# Patient Record
Sex: MALE | Race: WHITE | NOT HISPANIC OR LATINO
[De-identification: names, ages, dates, MRNs, and addresses within clinical notes are randomized per-mention and may not be internally consistent; named-entity substitution may affect disease eponyms.]

---

## 2017-04-05 ENCOUNTER — APPOINTMENT (OUTPATIENT)
Dept: UROLOGY | Facility: CLINIC | Age: 67
End: 2017-04-05

## 2017-04-06 LAB
APPEARANCE: CLEAR
BACTERIA: NEGATIVE
BILIRUBIN URINE: NEGATIVE
BLOOD URINE: NEGATIVE
COLOR: YELLOW
CORE LAB FLUID CYTOLOGY: NORMAL
GLUCOSE QUALITATIVE U: NORMAL MG/DL
HYALINE CASTS: 0 /LPF
KETONES URINE: NEGATIVE
LEUKOCYTE ESTERASE URINE: NEGATIVE
MICROSCOPIC-UA: NORMAL
NITRITE URINE: NEGATIVE
PH URINE: 6
PROTEIN URINE: NEGATIVE MG/DL
PSA FREE FLD-MCNC: 23.8 %
PSA FREE SERPL-MCNC: 0.71 NG/ML
PSA SERPL-MCNC: 2.98 NG/ML
RED BLOOD CELLS URINE: 2 /HPF
SPECIFIC GRAVITY URINE: 1.02
SQUAMOUS EPITHELIAL CELLS: 0 /HPF
UROBILINOGEN URINE: NORMAL MG/DL
WHITE BLOOD CELLS URINE: 1 /HPF

## 2017-05-03 ENCOUNTER — RX RENEWAL (OUTPATIENT)
Age: 67
End: 2017-05-03

## 2017-10-11 ENCOUNTER — APPOINTMENT (OUTPATIENT)
Dept: UROLOGY | Facility: CLINIC | Age: 67
End: 2017-10-11
Payer: MEDICARE

## 2017-10-11 PROCEDURE — 51798 US URINE CAPACITY MEASURE: CPT

## 2017-10-11 PROCEDURE — 99214 OFFICE O/P EST MOD 30 MIN: CPT

## 2017-10-12 LAB
APPEARANCE: CLEAR
BACTERIA: NEGATIVE
BILIRUBIN URINE: NEGATIVE
BLOOD URINE: NEGATIVE
COLOR: YELLOW
CORE LAB FLUID CYTOLOGY: NORMAL
GLUCOSE QUALITATIVE U: NEGATIVE MG/DL
KETONES URINE: NEGATIVE
LEUKOCYTE ESTERASE URINE: NEGATIVE
MICROSCOPIC-UA: NORMAL
NITRITE URINE: NEGATIVE
PH URINE: 5.5
PROTEIN URINE: NEGATIVE MG/DL
PSA FREE FLD-MCNC: 25.8
PSA FREE SERPL-MCNC: 0.66 NG/ML
PSA SERPL-MCNC: 2.56 NG/ML
RED BLOOD CELLS URINE: 3 /HPF
SPECIFIC GRAVITY URINE: 1.01
SQUAMOUS EPITHELIAL CELLS: 0 /HPF
UROBILINOGEN URINE: NEGATIVE MG/DL
WHITE BLOOD CELLS URINE: 0 /HPF

## 2017-10-23 ENCOUNTER — RX RENEWAL (OUTPATIENT)
Age: 67
End: 2017-10-23

## 2017-11-17 ENCOUNTER — RX RENEWAL (OUTPATIENT)
Age: 67
End: 2017-11-17

## 2018-04-18 ENCOUNTER — APPOINTMENT (OUTPATIENT)
Dept: UROLOGY | Facility: CLINIC | Age: 68
End: 2018-04-18
Payer: MEDICARE

## 2018-04-18 DIAGNOSIS — R97.20 ELEVATED PROSTATE, SPECIFIC ANTIGEN [PSA]: ICD-10-CM

## 2018-04-18 DIAGNOSIS — Z00.00 ENCOUNTER FOR GENERAL ADULT MEDICAL EXAMINATION W/OUT ABNORMAL FINDINGS: ICD-10-CM

## 2018-04-18 PROCEDURE — 99214 OFFICE O/P EST MOD 30 MIN: CPT

## 2018-04-18 PROCEDURE — 51798 US URINE CAPACITY MEASURE: CPT

## 2018-04-19 LAB
APPEARANCE: CLEAR
BACTERIA: NEGATIVE
BILIRUBIN URINE: NEGATIVE
BLOOD URINE: NEGATIVE
COLOR: YELLOW
GLUCOSE QUALITATIVE U: NEGATIVE MG/DL
KETONES URINE: NEGATIVE
LEUKOCYTE ESTERASE URINE: NEGATIVE
MICROSCOPIC-UA: NORMAL
NITRITE URINE: NEGATIVE
PH URINE: 5.5
PROTEIN URINE: NEGATIVE MG/DL
PSA FREE FLD-MCNC: 25.7
PSA FREE SERPL-MCNC: 0.67 NG/ML
PSA SERPL-MCNC: 2.61 NG/ML
RED BLOOD CELLS URINE: 0 /HPF
SPECIFIC GRAVITY URINE: 1.01
SQUAMOUS EPITHELIAL CELLS: 0 /HPF
UROBILINOGEN URINE: NEGATIVE MG/DL
WHITE BLOOD CELLS URINE: 0 /HPF

## 2018-04-23 LAB — CORE LAB FLUID CYTOLOGY: NORMAL

## 2018-06-13 ENCOUNTER — RX RENEWAL (OUTPATIENT)
Age: 68
End: 2018-06-13

## 2018-10-17 ENCOUNTER — APPOINTMENT (OUTPATIENT)
Dept: UROLOGY | Facility: CLINIC | Age: 68
End: 2018-10-17
Payer: MEDICARE

## 2018-10-17 PROCEDURE — 99214 OFFICE O/P EST MOD 30 MIN: CPT

## 2018-10-18 LAB
APPEARANCE: CLEAR
BACTERIA: NEGATIVE
BILIRUBIN URINE: NEGATIVE
BLOOD URINE: NEGATIVE
COLOR: YELLOW
GLUCOSE QUALITATIVE U: NEGATIVE MG/DL
KETONES URINE: NEGATIVE
LEUKOCYTE ESTERASE URINE: NEGATIVE
MICROSCOPIC-UA: NORMAL
NITRITE URINE: NEGATIVE
PH URINE: 5.5
PROTEIN URINE: NEGATIVE MG/DL
PSA FREE FLD-MCNC: 30
PSA FREE SERPL-MCNC: 0.86 NG/ML
PSA SERPL-MCNC: 2.87 NG/ML
RED BLOOD CELLS URINE: 1 /HPF
SPECIFIC GRAVITY URINE: 1.01
SQUAMOUS EPITHELIAL CELLS: 0 /HPF
UROBILINOGEN URINE: NEGATIVE MG/DL
WHITE BLOOD CELLS URINE: 1 /HPF

## 2018-12-16 ENCOUNTER — RX RENEWAL (OUTPATIENT)
Age: 68
End: 2018-12-16

## 2019-03-19 ENCOUNTER — RX RENEWAL (OUTPATIENT)
Age: 69
End: 2019-03-19

## 2019-04-11 ENCOUNTER — APPOINTMENT (OUTPATIENT)
Dept: UROLOGY | Facility: CLINIC | Age: 69
End: 2019-04-11
Payer: MEDICARE

## 2019-04-11 PROCEDURE — 51798 US URINE CAPACITY MEASURE: CPT

## 2019-04-11 PROCEDURE — 99214 OFFICE O/P EST MOD 30 MIN: CPT | Mod: 25

## 2019-04-11 NOTE — PHYSICAL EXAM
[General Appearance - Well Developed] : well developed [General Appearance - Well Nourished] : well nourished [Well Groomed] : well groomed [Normal Appearance] : normal appearance [Abdomen Soft] : soft [General Appearance - In No Acute Distress] : no acute distress [Abdomen Tenderness] : non-tender [Costovertebral Angle Tenderness] : no ~M costovertebral angle tenderness [Urethral Meatus] : meatus normal [Urinary Bladder Findings] : the bladder was normal on palpation [Testes Mass (___cm)] : there were no testicular masses [Scrotum] : the scrotum was normal [No Prostate Nodules] : no prostate nodules [] : no respiratory distress [Edema] : no peripheral edema [Respiration, Rhythm And Depth] : normal respiratory rhythm and effort [Exaggerated Use Of Accessory Muscles For Inspiration] : no accessory muscle use [Oriented To Time, Place, And Person] : oriented to person, place, and time [Affect] : the affect was normal [Mood] : the mood was normal [Not Anxious] : not anxious [Normal Station and Gait] : the gait and station were normal for the patient's age [No Focal Deficits] : no focal deficits [No Palpable Adenopathy] : no palpable adenopathy

## 2019-04-12 LAB
APPEARANCE: CLEAR
BACTERIA: NEGATIVE
BILIRUBIN URINE: NEGATIVE
BLOOD URINE: NEGATIVE
COLOR: YELLOW
GLUCOSE QUALITATIVE U: NEGATIVE
HYALINE CASTS: 0 /LPF
KETONES URINE: NEGATIVE
LEUKOCYTE ESTERASE URINE: NEGATIVE
MICROSCOPIC-UA: NORMAL
NITRITE URINE: NEGATIVE
PH URINE: 6
PROTEIN URINE: NEGATIVE
PSA FREE FLD-MCNC: 29 %
PSA FREE SERPL-MCNC: 0.71 NG/ML
PSA SERPL-MCNC: 2.43 NG/ML
RED BLOOD CELLS URINE: 1 /HPF
SPECIFIC GRAVITY URINE: 1.02
SQUAMOUS EPITHELIAL CELLS: 0 /HPF
UROBILINOGEN URINE: NORMAL
WHITE BLOOD CELLS URINE: 0 /HPF

## 2019-06-17 ENCOUNTER — RX RENEWAL (OUTPATIENT)
Age: 69
End: 2019-06-17

## 2019-09-15 ENCOUNTER — RX RENEWAL (OUTPATIENT)
Age: 69
End: 2019-09-15

## 2019-10-03 ENCOUNTER — APPOINTMENT (OUTPATIENT)
Dept: UROLOGY | Facility: CLINIC | Age: 69
End: 2019-10-03
Payer: MEDICARE

## 2019-10-03 DIAGNOSIS — R97.20 ELEVATED PROSTATE, SPECIFIC ANTIGEN [PSA]: ICD-10-CM

## 2019-10-03 PROCEDURE — 99214 OFFICE O/P EST MOD 30 MIN: CPT

## 2019-10-04 LAB
APPEARANCE: CLEAR
BACTERIA: NEGATIVE
BILIRUBIN URINE: NEGATIVE
BLOOD URINE: NEGATIVE
COLOR: NORMAL
GLUCOSE QUALITATIVE U: NEGATIVE
HYALINE CASTS: 0 /LPF
KETONES URINE: NEGATIVE
LEUKOCYTE ESTERASE URINE: NEGATIVE
MICROSCOPIC-UA: NORMAL
NITRITE URINE: NEGATIVE
PH URINE: 6.5
PROTEIN URINE: NEGATIVE
PSA FREE FLD-MCNC: 30 %
PSA FREE SERPL-MCNC: 0.65 NG/ML
PSA SERPL-MCNC: 2.16 NG/ML
RED BLOOD CELLS URINE: 1 /HPF
SPECIFIC GRAVITY URINE: 1.01
SQUAMOUS EPITHELIAL CELLS: 0 /HPF
UROBILINOGEN URINE: NORMAL
WHITE BLOOD CELLS URINE: 0 /HPF

## 2020-02-06 ENCOUNTER — APPOINTMENT (OUTPATIENT)
Dept: UROLOGY | Facility: CLINIC | Age: 70
End: 2020-02-06

## 2020-06-25 ENCOUNTER — APPOINTMENT (OUTPATIENT)
Dept: UROLOGY | Facility: CLINIC | Age: 70
End: 2020-06-25

## 2020-06-27 ENCOUNTER — RX RENEWAL (OUTPATIENT)
Age: 70
End: 2020-06-27

## 2021-06-18 RX ORDER — FINASTERIDE 5 MG/1
5 TABLET, FILM COATED ORAL
Qty: 90 | Refills: 3 | Status: ACTIVE | COMMUNITY
Start: 2021-06-18 | End: 1900-01-01

## 2021-06-18 RX ORDER — ALFUZOSIN HYDROCHLORIDE 10 MG/1
10 TABLET, EXTENDED RELEASE ORAL
Qty: 90 | Refills: 3 | Status: ACTIVE | COMMUNITY
Start: 2021-06-18 | End: 1900-01-01

## 2021-06-22 ENCOUNTER — APPOINTMENT (OUTPATIENT)
Dept: UROLOGY | Facility: CLINIC | Age: 71
End: 2021-06-22
Payer: MEDICARE

## 2021-06-22 VITALS — TEMPERATURE: 97.7 F

## 2021-06-22 PROCEDURE — 99214 OFFICE O/P EST MOD 30 MIN: CPT

## 2021-06-22 PROCEDURE — 51798 US URINE CAPACITY MEASURE: CPT

## 2021-06-23 LAB
APPEARANCE: CLEAR
BACTERIA: NEGATIVE
BILIRUBIN URINE: NEGATIVE
BLOOD URINE: NEGATIVE
COLOR: NORMAL
GLUCOSE QUALITATIVE U: NEGATIVE
HYALINE CASTS: 1 /LPF
KETONES URINE: NEGATIVE
LEUKOCYTE ESTERASE URINE: NEGATIVE
MICROSCOPIC-UA: NORMAL
NITRITE URINE: NEGATIVE
PH URINE: 6
PROTEIN URINE: NORMAL
PSA FREE FLD-MCNC: 30 %
PSA FREE SERPL-MCNC: 0.71 NG/ML
PSA SERPL-MCNC: 2.32 NG/ML
RED BLOOD CELLS URINE: 1 /HPF
SPECIFIC GRAVITY URINE: 1.02
SQUAMOUS EPITHELIAL CELLS: 0 /HPF
UROBILINOGEN URINE: NORMAL
WHITE BLOOD CELLS URINE: 1 /HPF

## 2021-10-19 ENCOUNTER — APPOINTMENT (OUTPATIENT)
Dept: GASTROENTEROLOGY | Facility: CLINIC | Age: 71
End: 2021-10-19

## 2022-01-11 ENCOUNTER — APPOINTMENT (OUTPATIENT)
Dept: UROLOGY | Facility: CLINIC | Age: 72
End: 2022-01-11

## 2022-05-17 ENCOUNTER — LABORATORY RESULT (OUTPATIENT)
Age: 72
End: 2022-05-17

## 2022-05-17 ENCOUNTER — APPOINTMENT (OUTPATIENT)
Dept: UROLOGY | Facility: CLINIC | Age: 72
End: 2022-05-17
Payer: MEDICARE

## 2022-05-17 PROCEDURE — 99214 OFFICE O/P EST MOD 30 MIN: CPT

## 2022-11-22 ENCOUNTER — APPOINTMENT (OUTPATIENT)
Dept: UROLOGY | Facility: CLINIC | Age: 72
End: 2022-11-22

## 2022-11-22 VITALS
HEART RATE: 71 BPM | SYSTOLIC BLOOD PRESSURE: 153 MMHG | OXYGEN SATURATION: 99 % | HEIGHT: 71 IN | RESPIRATION RATE: 16 BRPM | WEIGHT: 189 LBS | TEMPERATURE: 97.6 F | BODY MASS INDEX: 26.46 KG/M2 | DIASTOLIC BLOOD PRESSURE: 96 MMHG

## 2022-11-22 DIAGNOSIS — R35.0 FREQUENCY OF MICTURITION: ICD-10-CM

## 2022-11-22 PROCEDURE — 99214 OFFICE O/P EST MOD 30 MIN: CPT

## 2022-11-23 LAB
APPEARANCE: CLEAR
BACTERIA: NEGATIVE
BILIRUBIN URINE: NEGATIVE
BLOOD URINE: NEGATIVE
COLOR: NORMAL
GLUCOSE QUALITATIVE U: NEGATIVE
HYALINE CASTS: 0 /LPF
KETONES URINE: NEGATIVE
LEUKOCYTE ESTERASE URINE: NEGATIVE
MICROSCOPIC-UA: NORMAL
NITRITE URINE: NEGATIVE
PH URINE: 6
PROTEIN URINE: NEGATIVE
PSA FREE FLD-MCNC: 32 %
PSA FREE SERPL-MCNC: 0.99 NG/ML
PSA SERPL-MCNC: 3.07 NG/ML
RED BLOOD CELLS URINE: 1 /HPF
SPECIFIC GRAVITY URINE: 1.01
SQUAMOUS EPITHELIAL CELLS: 0 /HPF
UROBILINOGEN URINE: NORMAL
WHITE BLOOD CELLS URINE: 0 /HPF

## 2023-05-30 ENCOUNTER — APPOINTMENT (OUTPATIENT)
Dept: UROLOGY | Facility: CLINIC | Age: 73
End: 2023-05-30
Payer: MEDICARE

## 2023-05-30 DIAGNOSIS — R97.20 ELEVATED PROSTATE, SPECIFIC ANTIGEN [PSA]: ICD-10-CM

## 2023-05-30 PROCEDURE — 99214 OFFICE O/P EST MOD 30 MIN: CPT

## 2023-05-31 LAB
APPEARANCE: CLEAR
BACTERIA: NEGATIVE /HPF
BILIRUBIN URINE: NEGATIVE
BLOOD URINE: NEGATIVE
CAST: 0 /LPF
COLOR: YELLOW
EPITHELIAL CELLS: 0 /HPF
GLUCOSE QUALITATIVE U: NEGATIVE MG/DL
KETONES URINE: NEGATIVE MG/DL
LEUKOCYTE ESTERASE URINE: NEGATIVE
MICROSCOPIC-UA: NORMAL
NITRITE URINE: NEGATIVE
PH URINE: 6
PROTEIN URINE: NEGATIVE MG/DL
PSA FREE FLD-MCNC: 28 %
PSA FREE SERPL-MCNC: 0.8 NG/ML
PSA SERPL-MCNC: 2.83 NG/ML
RED BLOOD CELLS URINE: 0 /HPF
SPECIFIC GRAVITY URINE: 1.01
UROBILINOGEN URINE: 0.2 MG/DL
WHITE BLOOD CELLS URINE: 0 /HPF

## 2023-08-16 ENCOUNTER — APPOINTMENT (OUTPATIENT)
Dept: UROLOGY | Facility: CLINIC | Age: 73
End: 2023-08-16
Payer: MEDICARE

## 2023-08-16 ENCOUNTER — OUTPATIENT (OUTPATIENT)
Dept: OUTPATIENT SERVICES | Facility: HOSPITAL | Age: 73
LOS: 1 days | End: 2023-08-16
Payer: MEDICARE

## 2023-08-16 PROCEDURE — 51798 US URINE CAPACITY MEASURE: CPT

## 2023-08-16 PROCEDURE — 99214 OFFICE O/P EST MOD 30 MIN: CPT | Mod: 25

## 2023-08-16 PROCEDURE — 51702 INSERT TEMP BLADDER CATH: CPT

## 2023-08-17 ENCOUNTER — RESULT REVIEW (OUTPATIENT)
Age: 73
End: 2023-08-17

## 2023-08-17 ENCOUNTER — APPOINTMENT (OUTPATIENT)
Dept: CT IMAGING | Facility: IMAGING CENTER | Age: 73
End: 2023-08-17
Payer: MEDICARE

## 2023-08-17 ENCOUNTER — OUTPATIENT (OUTPATIENT)
Dept: OUTPATIENT SERVICES | Facility: HOSPITAL | Age: 73
LOS: 1 days | End: 2023-08-17
Payer: MEDICARE

## 2023-08-17 DIAGNOSIS — K59.00 CONSTIPATION, UNSPECIFIED: ICD-10-CM

## 2023-08-17 DIAGNOSIS — N40.1 BENIGN PROSTATIC HYPERPLASIA WITH LOWER URINARY TRACT SYMPTOMS: ICD-10-CM

## 2023-08-17 DIAGNOSIS — R31.29 OTHER MICROSCOPIC HEMATURIA: ICD-10-CM

## 2023-08-17 DIAGNOSIS — R33.9 RETENTION OF URINE, UNSPECIFIED: ICD-10-CM

## 2023-08-17 LAB
APPEARANCE: CLEAR
BACTERIA: NEGATIVE /HPF
BILIRUBIN URINE: NEGATIVE
BLOOD URINE: ABNORMAL
CAST: 0 /LPF
COLOR: ABNORMAL
EPITHELIAL CELLS: 0 /HPF
GLUCOSE QUALITATIVE U: NEGATIVE MG/DL
KETONES URINE: 15 MG/DL
LEUKOCYTE ESTERASE URINE: ABNORMAL
MICROSCOPIC-UA: NORMAL
NITRITE URINE: NEGATIVE
PH URINE: 6.5
PROTEIN URINE: 30 MG/DL
RED BLOOD CELLS URINE: 14 /HPF
SPECIFIC GRAVITY URINE: 1
UROBILINOGEN URINE: 0.2 MG/DL
WHITE BLOOD CELLS URINE: 1 /HPF

## 2023-08-17 PROCEDURE — 74176 CT ABD & PELVIS W/O CONTRAST: CPT

## 2023-08-17 PROCEDURE — 74176 CT ABD & PELVIS W/O CONTRAST: CPT | Mod: 26,MH

## 2023-08-18 LAB
ANION GAP SERPL CALC-SCNC: 12 MMOL/L
BACTERIA UR CULT: NORMAL
BUN SERPL-MCNC: 16 MG/DL
CALCIUM SERPL-MCNC: 10.4 MG/DL
CHLORIDE SERPL-SCNC: 98 MMOL/L
CO2 SERPL-SCNC: 25 MMOL/L
CREAT SERPL-MCNC: 1.37 MG/DL
EGFR: 54 ML/MIN/1.73M2
GLUCOSE SERPL-MCNC: 98 MG/DL
POTASSIUM SERPL-SCNC: 4.1 MMOL/L
PSA FREE FLD-MCNC: 31 %
PSA FREE SERPL-MCNC: 1.14 NG/ML
PSA SERPL-MCNC: 3.71 NG/ML
SODIUM SERPL-SCNC: 135 MMOL/L
URINE CYTOLOGY: NORMAL

## 2023-08-21 RX ORDER — CIPROFLOXACIN HYDROCHLORIDE 500 MG/1
500 TABLET, FILM COATED ORAL
Qty: 2 | Refills: 0 | Status: COMPLETED | OUTPATIENT
Start: 2023-08-16 | End: 2023-08-17

## 2023-08-21 NOTE — PHYSICAL EXAM
[General Appearance - Well Developed] : well developed [General Appearance - Well Nourished] : well nourished [Normal Appearance] : normal appearance [Well Groomed] : well groomed [General Appearance - In No Acute Distress] : no acute distress [Urethral Meatus] : meatus normal [Urinary Bladder Findings] : the bladder was normal on palpation [Scrotum] : the scrotum was normal [Testes Mass (___cm)] : there were no testicular masses [Edema] : no peripheral edema [] : no respiratory distress [Respiration, Rhythm And Depth] : normal respiratory rhythm and effort [Exaggerated Use Of Accessory Muscles For Inspiration] : no accessory muscle use [FreeTextEntry1] : Bladder distended..

## 2023-08-21 NOTE — HISTORY OF PRESENT ILLNESS
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 He was seen by PCP Dr. Ayala this afternoon and was advised to see urologist ASAP today. Patient refused to go to ER or urgent care. Dr Ayala present during this visit.  Patient with BPH and marked enlarged prostate, currently on Alfuzosin 10 mg and Finasteride, has little urine for 2-3 days with urinary frequency every 20 minutes. Bladder noted to be significantly distended. He thought it was caused constipation.  cc. Patient declined CIC.  Plan: Nava size 16 [place] coude inserted. Urine sent for Urinalysis, culture and cytology, BMP/Creatinine, PSA, and CT abd and pelvis non-contrast. Cipro 500 mg x2 given. Continue with Alfuzosin and Finasteride. Return in 1 week to follow up with Dr. Ac.

## 2023-08-21 NOTE — ASSESSMENT
[FreeTextEntry1] : Patient with BPH and marked enlarged prostate, currently on Alfuzosin 10 mg and Finasteride, has little urine for 2-3 days with urinary frequency every 20 minutes. Bladder noted to be significantly distended.   cc. Patient declined CIC.  Plan: Nava size 16 [place] coude inserted. Urine sent for Urinalysis, culture and cytology, BMP/Creatinine, PSA, and CT abd and pelvis non-contrast. Cipro 500 mg x2 given. Continue with Alfuzosin and Finasteride. Return in 1 week to follow up with Dr. Ac.

## 2023-08-24 ENCOUNTER — APPOINTMENT (OUTPATIENT)
Dept: UROLOGY | Facility: CLINIC | Age: 73
End: 2023-08-24
Payer: MEDICARE

## 2023-08-24 VITALS
BODY MASS INDEX: 26.62 KG/M2 | HEART RATE: 79 BPM | OXYGEN SATURATION: 99 % | SYSTOLIC BLOOD PRESSURE: 134 MMHG | DIASTOLIC BLOOD PRESSURE: 83 MMHG | RESPIRATION RATE: 16 BRPM | HEIGHT: 70.5 IN | WEIGHT: 188 LBS

## 2023-08-24 DIAGNOSIS — R33.9 RETENTION OF URINE, UNSPECIFIED: ICD-10-CM

## 2023-08-24 PROCEDURE — 99214 OFFICE O/P EST MOD 30 MIN: CPT

## 2023-08-28 ENCOUNTER — NON-APPOINTMENT (OUTPATIENT)
Age: 73
End: 2023-08-28

## 2023-09-20 ENCOUNTER — APPOINTMENT (OUTPATIENT)
Dept: UROLOGY | Facility: CLINIC | Age: 73
End: 2023-09-20
Payer: MEDICARE

## 2023-09-20 ENCOUNTER — OUTPATIENT (OUTPATIENT)
Dept: OUTPATIENT SERVICES | Facility: HOSPITAL | Age: 73
LOS: 1 days | End: 2023-09-20
Payer: MEDICARE

## 2023-09-20 VITALS — HEART RATE: 85 BPM | DIASTOLIC BLOOD PRESSURE: 87 MMHG | SYSTOLIC BLOOD PRESSURE: 143 MMHG | RESPIRATION RATE: 16 BRPM

## 2023-09-20 DIAGNOSIS — R35.0 FREQUENCY OF MICTURITION: ICD-10-CM

## 2023-09-20 PROCEDURE — 51703 INSERT BLADDER CATH COMPLEX: CPT

## 2023-09-20 PROCEDURE — ZZZZZ: CPT

## 2023-09-20 RX ORDER — CIPROFLOXACIN HYDROCHLORIDE 500 MG/1
500 TABLET, FILM COATED ORAL
Qty: 2 | Refills: 0 | Status: ACTIVE | OUTPATIENT
Start: 2023-09-20

## 2023-09-20 RX ORDER — CIPROFLOXACIN HYDROCHLORIDE 500 MG/1
500 TABLET, FILM COATED ORAL
Refills: 0 | Status: COMPLETED | OUTPATIENT
Start: 2023-09-20

## 2023-09-21 DIAGNOSIS — R33.9 RETENTION OF URINE, UNSPECIFIED: ICD-10-CM

## 2023-09-21 DIAGNOSIS — N40.1 BENIGN PROSTATIC HYPERPLASIA WITH LOWER URINARY TRACT SYMPTOMS: ICD-10-CM

## 2023-09-21 LAB
APPEARANCE: CLEAR
BACTERIA: NEGATIVE /HPF
BILIRUBIN URINE: NEGATIVE
BLOOD URINE: ABNORMAL
CAST: 0 /LPF
COLOR: YELLOW
EPITHELIAL CELLS: 2 /HPF
GLUCOSE QUALITATIVE U: NEGATIVE MG/DL
KETONES URINE: NEGATIVE MG/DL
LEUKOCYTE ESTERASE URINE: ABNORMAL
MICROSCOPIC-UA: NORMAL
NITRITE URINE: NEGATIVE
PH URINE: 6
PROTEIN URINE: 30 MG/DL
RED BLOOD CELLS URINE: 18 /HPF
SPECIFIC GRAVITY URINE: 1.01
UROBILINOGEN URINE: 0.2 MG/DL
WHITE BLOOD CELLS URINE: 48 /HPF

## 2023-09-24 LAB — BACTERIA UR CULT: ABNORMAL

## 2023-09-26 DIAGNOSIS — N40.1 BENIGN PROSTATIC HYPERPLASIA WITH LOWER URINARY TRACT SYMPTOMS: ICD-10-CM

## 2023-09-26 DIAGNOSIS — R33.9 RETENTION OF URINE, UNSPECIFIED: ICD-10-CM

## 2023-10-07 ENCOUNTER — INPATIENT (INPATIENT)
Facility: HOSPITAL | Age: 73
LOS: 1 days | Discharge: HOME | DRG: 862 | End: 2023-10-09
Attending: INTERNAL MEDICINE | Admitting: INTERNAL MEDICINE
Payer: MEDICARE

## 2023-10-07 VITALS
HEART RATE: 108 BPM | DIASTOLIC BLOOD PRESSURE: 91 MMHG | SYSTOLIC BLOOD PRESSURE: 178 MMHG | RESPIRATION RATE: 28 BRPM | TEMPERATURE: 101 F | OXYGEN SATURATION: 99 %

## 2023-10-07 LAB
ALBUMIN SERPL ELPH-MCNC: 3.7 G/DL — SIGNIFICANT CHANGE UP (ref 3.3–5)
ALP SERPL-CCNC: 73 U/L — SIGNIFICANT CHANGE UP (ref 40–120)
ALT FLD-CCNC: 12 U/L — SIGNIFICANT CHANGE UP (ref 10–45)
ANION GAP SERPL CALC-SCNC: 17 MMOL/L — SIGNIFICANT CHANGE UP (ref 5–17)
APPEARANCE UR: ABNORMAL
AST SERPL-CCNC: 15 U/L — SIGNIFICANT CHANGE UP (ref 10–40)
BACTERIA # UR AUTO: NEGATIVE — SIGNIFICANT CHANGE UP
BASE EXCESS BLDV CALC-SCNC: 0.7 MMOL/L — SIGNIFICANT CHANGE UP (ref -2–3)
BASE EXCESS BLDV CALC-SCNC: 2.7 MMOL/L — SIGNIFICANT CHANGE UP (ref -2–3)
BASOPHILS # BLD AUTO: 0.05 K/UL — SIGNIFICANT CHANGE UP (ref 0–0.2)
BASOPHILS NFR BLD AUTO: 0.3 % — SIGNIFICANT CHANGE UP (ref 0–2)
BILIRUB SERPL-MCNC: 0.6 MG/DL — SIGNIFICANT CHANGE UP (ref 0.2–1.2)
BILIRUB UR-MCNC: ABNORMAL
BUN SERPL-MCNC: 8 MG/DL — SIGNIFICANT CHANGE UP (ref 7–23)
CA-I SERPL-SCNC: 1.28 MMOL/L — SIGNIFICANT CHANGE UP (ref 1.15–1.33)
CA-I SERPL-SCNC: 1.35 MMOL/L — HIGH (ref 1.15–1.33)
CALCIUM SERPL-MCNC: 10.5 MG/DL — SIGNIFICANT CHANGE UP (ref 8.4–10.5)
CHLORIDE BLDV-SCNC: 98 MMOL/L — SIGNIFICANT CHANGE UP (ref 96–108)
CHLORIDE BLDV-SCNC: 99 MMOL/L — SIGNIFICANT CHANGE UP (ref 96–108)
CHLORIDE SERPL-SCNC: 96 MMOL/L — SIGNIFICANT CHANGE UP (ref 96–108)
CO2 BLDV-SCNC: 21 MMOL/L — LOW (ref 22–26)
CO2 BLDV-SCNC: 27 MMOL/L — HIGH (ref 22–26)
CO2 SERPL-SCNC: 21 MMOL/L — LOW (ref 22–31)
COLOR SPEC: SIGNIFICANT CHANGE UP
CREAT SERPL-MCNC: 1.02 MG/DL — SIGNIFICANT CHANGE UP (ref 0.5–1.3)
DIFF PNL FLD: ABNORMAL
EGFR: 77 ML/MIN/1.73M2 — SIGNIFICANT CHANGE UP
EOSINOPHIL # BLD AUTO: 0.23 K/UL — SIGNIFICANT CHANGE UP (ref 0–0.5)
EOSINOPHIL NFR BLD AUTO: 1.6 % — SIGNIFICANT CHANGE UP (ref 0–6)
EPI CELLS # UR: 0 /HPF — SIGNIFICANT CHANGE UP
GAS PNL BLDV: 129 MMOL/L — LOW (ref 136–145)
GAS PNL BLDV: 130 MMOL/L — LOW (ref 136–145)
GAS PNL BLDV: SIGNIFICANT CHANGE UP
GLUCOSE BLDV-MCNC: 106 MG/DL — HIGH (ref 70–99)
GLUCOSE BLDV-MCNC: 120 MG/DL — HIGH (ref 70–99)
GLUCOSE SERPL-MCNC: 118 MG/DL — HIGH (ref 70–99)
GLUCOSE UR QL: NEGATIVE — SIGNIFICANT CHANGE UP
HCO3 BLDV-SCNC: 21 MMOL/L — LOW (ref 22–29)
HCO3 BLDV-SCNC: 26 MMOL/L — SIGNIFICANT CHANGE UP (ref 22–29)
HCT VFR BLD CALC: 32.5 % — LOW (ref 39–50)
HCT VFR BLDA CALC: 32 % — LOW (ref 39–51)
HCT VFR BLDA CALC: 38 % — LOW (ref 39–51)
HGB BLD CALC-MCNC: 10.7 G/DL — LOW (ref 12.6–17.4)
HGB BLD CALC-MCNC: 12.6 G/DL — SIGNIFICANT CHANGE UP (ref 12.6–17.4)
HGB BLD-MCNC: 11.4 G/DL — LOW (ref 13–17)
HYALINE CASTS # UR AUTO: 10 /LPF — HIGH (ref 0–2)
IMM GRANULOCYTES NFR BLD AUTO: 0.6 % — SIGNIFICANT CHANGE UP (ref 0–0.9)
KETONES UR-MCNC: SIGNIFICANT CHANGE UP
LACTATE BLDV-MCNC: 1.7 MMOL/L — SIGNIFICANT CHANGE UP (ref 0.5–2)
LACTATE BLDV-MCNC: 1.9 MMOL/L — SIGNIFICANT CHANGE UP (ref 0.5–2)
LEUKOCYTE ESTERASE UR-ACNC: NEGATIVE — SIGNIFICANT CHANGE UP
LYMPHOCYTES # BLD AUTO: 17.1 % — SIGNIFICANT CHANGE UP (ref 13–44)
LYMPHOCYTES # BLD AUTO: 2.45 K/UL — SIGNIFICANT CHANGE UP (ref 1–3.3)
MCHC RBC-ENTMCNC: 32.2 PG — SIGNIFICANT CHANGE UP (ref 27–34)
MCHC RBC-ENTMCNC: 35.1 GM/DL — SIGNIFICANT CHANGE UP (ref 32–36)
MCV RBC AUTO: 91.8 FL — SIGNIFICANT CHANGE UP (ref 80–100)
MONOCYTES # BLD AUTO: 1.14 K/UL — HIGH (ref 0–0.9)
MONOCYTES NFR BLD AUTO: 8 % — SIGNIFICANT CHANGE UP (ref 2–14)
NEUTROPHILS # BLD AUTO: 10.35 K/UL — HIGH (ref 1.8–7.4)
NEUTROPHILS NFR BLD AUTO: 72.4 % — SIGNIFICANT CHANGE UP (ref 43–77)
NITRITE UR-MCNC: NEGATIVE — SIGNIFICANT CHANGE UP
NRBC # BLD: 0 /100 WBCS — SIGNIFICANT CHANGE UP (ref 0–0)
OTHER CELLS CSF MANUAL: 14.5 ML/DL — LOW (ref 18–22)
PCO2 BLDV: 21 MMHG — LOW (ref 42–55)
PCO2 BLDV: 33 MMHG — LOW (ref 42–55)
PH BLDV: 7.5 — HIGH (ref 7.32–7.43)
PH BLDV: 7.6 — CRITICAL HIGH (ref 7.32–7.43)
PH UR: 7.5 — SIGNIFICANT CHANGE UP (ref 5–8)
PLATELET # BLD AUTO: 198 K/UL — SIGNIFICANT CHANGE UP (ref 150–400)
PO2 BLDV: 17 MMHG — LOW (ref 25–45)
PO2 BLDV: 45 MMHG — SIGNIFICANT CHANGE UP (ref 25–45)
POTASSIUM BLDV-SCNC: 3.6 MMOL/L — SIGNIFICANT CHANGE UP (ref 3.5–5.1)
POTASSIUM BLDV-SCNC: 4.3 MMOL/L — SIGNIFICANT CHANGE UP (ref 3.5–5.1)
POTASSIUM SERPL-MCNC: 3.8 MMOL/L — SIGNIFICANT CHANGE UP (ref 3.5–5.3)
POTASSIUM SERPL-SCNC: 3.8 MMOL/L — SIGNIFICANT CHANGE UP (ref 3.5–5.3)
PROT SERPL-MCNC: 6.4 G/DL — SIGNIFICANT CHANGE UP (ref 6–8.3)
PROT UR-MCNC: ABNORMAL
RBC # BLD: 3.54 M/UL — LOW (ref 4.2–5.8)
RBC # FLD: 14.7 % — HIGH (ref 10.3–14.5)
RBC CASTS # UR COMP ASSIST: 93 /HPF — HIGH (ref 0–4)
SAO2 % BLDV: 31.9 % — LOW (ref 67–88)
SAO2 % BLDV: 49.1 % — LOW (ref 67–88)
SODIUM SERPL-SCNC: 134 MMOL/L — LOW (ref 135–145)
SP GR SPEC: 1.02 — SIGNIFICANT CHANGE UP (ref 1.01–1.02)
TROPONIN T, HIGH SENSITIVITY RESULT: 13 NG/L — SIGNIFICANT CHANGE UP (ref 0–51)
UROBILINOGEN FLD QL: ABNORMAL
WBC # BLD: 14.3 K/UL — HIGH (ref 3.8–10.5)
WBC # FLD AUTO: 14.3 K/UL — HIGH (ref 3.8–10.5)
WBC UR QL: 0 /HPF — SIGNIFICANT CHANGE UP (ref 0–5)

## 2023-10-07 PROCEDURE — 70498 CT ANGIOGRAPHY NECK: CPT | Mod: 26,MA

## 2023-10-07 PROCEDURE — 99291 CRITICAL CARE FIRST HOUR: CPT

## 2023-10-07 PROCEDURE — 0042T: CPT | Mod: MA

## 2023-10-07 PROCEDURE — 70450 CT HEAD/BRAIN W/O DYE: CPT | Mod: 26,MA,XU

## 2023-10-07 PROCEDURE — 70496 CT ANGIOGRAPHY HEAD: CPT | Mod: 26,MA

## 2023-10-07 RX ORDER — SODIUM CHLORIDE 9 MG/ML
1000 INJECTION INTRAMUSCULAR; INTRAVENOUS; SUBCUTANEOUS ONCE
Refills: 0 | Status: COMPLETED | OUTPATIENT
Start: 2023-10-07 | End: 2023-10-07

## 2023-10-07 RX ORDER — CEFEPIME 1 G/1
2000 INJECTION, POWDER, FOR SOLUTION INTRAMUSCULAR; INTRAVENOUS ONCE
Refills: 0 | Status: COMPLETED | OUTPATIENT
Start: 2023-10-07 | End: 2023-10-07

## 2023-10-07 RX ORDER — ACETAMINOPHEN 500 MG
1000 TABLET ORAL ONCE
Refills: 0 | Status: COMPLETED | OUTPATIENT
Start: 2023-10-07 | End: 2023-10-07

## 2023-10-07 RX ADMIN — SODIUM CHLORIDE 1000 MILLILITER(S): 9 INJECTION INTRAMUSCULAR; INTRAVENOUS; SUBCUTANEOUS at 22:34

## 2023-10-07 RX ADMIN — CEFEPIME 100 MILLIGRAM(S): 1 INJECTION, POWDER, FOR SOLUTION INTRAMUSCULAR; INTRAVENOUS at 21:01

## 2023-10-07 RX ADMIN — Medication 400 MILLIGRAM(S): at 21:01

## 2023-10-07 NOTE — ED ADULT NURSE NOTE - OBJECTIVE STATEMENT
73y Male presents to the ED brought in by EMS from home c/o expressive aphasia. code stroke called. PMH enlarged prostate. Pt accompanied by friend who states his last known normal was 6PM. Pt to ativan 0.5mg and family friend believed he was more lethargic from that. Pt has expressive aphasia which is new for him. Pt ambulates independently at baseline. Pt had a procedure done on Wednesday 10/4 at [facility] for his prostate, Pt dc yesterday with Nava catheter in place. Pts urine bright red. Pt is A&Ox2 (name, , and place). Pt febrile upon arrival to ED. Pt is unable to formulate sentences. Respirations spontaneous, unlabored, and equal bilaterally. Patient safety maintained, bed is in lowest position, wheels locked, and side rails raised. Patient oriented to call bell, and call bell is within reach.

## 2023-10-07 NOTE — ED PROVIDER NOTE - SEVERE SEPSIS ALERT DETAILS
Febrile rectally with AMS. Likely source is urine with recent instrumentation including TURP. Abdomen soft and nontender. Tachycardia improving with antipyretics and 1L fluids, abx. Lactate normal not hypotensive so does not need 30 mL/kg fluids.

## 2023-10-07 NOTE — CONSULT NOTE ADULT - SUBJECTIVE AND OBJECTIVE BOX
HPI:  73M w/ BPH s/p prostate surgery on 10/4/23 presents with speech disturbance. LKN 10/7/23 at 17:30 per Ricardo (lifelong friend) prior to pt was noted to say "I feel cold" and that he wanted to nap a little longer, napped starting around 16:45. On 10/7/23 at 18:00, pt was noted to have a speech disturbance. At baseline, pt ambulates without any assistance or assistive devices, does all ADLs independently, speaking normally. Not on AC/AP, was discharged on 10/6/23 from hospital and was on subcutaneous heparin (unclear time or dose). Of note, pt had a prostate surgery on 10/4/23, noted to have significant bleeding in his benitez, ~4000ml of blood emptied overnight on 10/6/23 and ~1500ml of blood emptied on 10/7/23.     (Stroke only)  LKN: 10/7/23 at 17:30  NIHSS: 2  preMRS: 0  Pt is not a candidate for tenecteplase due to significant bleeding  Pt is not a candidate for mechanical thrombectomy due to no large vessel occlusion on CTA    REVIEW OF SYSTEMS    A 10-system ROS was performed and is negative except for those items noted above and/or in the HPI.    PAST MEDICAL & SURGICAL HISTORY:    FAMILY HISTORY:    SOCIAL HISTORY:   T/E/D:   Occupation:   Lives with:     MEDICATIONS (HOME):  Home Medications:    MEDICATIONS  (STANDING):    MEDICATIONS  (PRN):    ALLERGIES/INTOLERANCES:  Allergies  No Known Allergies    Intolerances    VITALS & EXAMINATION:  Vital Signs Last 24 Hrs  T(C): --  T(F): --  HR: --  BP: --  BP(mean): --  RR: --  SpO2: --      General:  Constitutional: Obese Male, appears stated age, in no apparent distress including pain  Head: Normocephalic & atraumatic.  ENT: Patent ear canals, intact TM, mucus membranes moist & pink, neck supple, no lymphadenopathy.   Respiratory: Patent airway. All lung fields are clear to auscultation bilaterally.  Extremities: No cyanosis, clubbing, or edema.  Skin: No rashes, bruising, or discoloration.    Cardiovascular (>2): RRR no murmurs. Carotid pulsations symmetric, no bruits. Normal capillary beds refill, 1-2 seconds or less.     Neurological (>12):  MS: Awake, alert, oriented to person, place, situation, time. Normal affect. Follows all commands.    Language: Speech is clear, fluent with good repetition & comprehension (able to name objects___)    CNs: PERRLA (R = 3mm, L = 3mm). VFF. EOMI no nystagmus, no diplopia. V1-3 intact to LT/pinprick, well developed masseter muscles b/l. No facial asymmetry b/l, full eye closure strength b/l. Hearing grossly normal (rubbing fingers) b/l. Symmetric palate elevation in midline. Gag reflex deferred. Head turning & shoulder shrug intact b/l. Tongue midline, normal movements, no atrophy.    Fundoscopic: pale w/ sharp discs margins No vascular changes.      Motor: Normal muscle bulk & tone. No noticeable tremor or seizure. No pronator drift.              Deltoid	Biceps	Triceps	Wrist	Finger ABd	   R	5	5	5	5	5		5 	  L	5	5	5	5	5		5    	H-Flex	H-Ext	H-ABd	H-ADd	K-Flex	K-Ext	D-Flex	P-Flex  R	5	5	5	5	5	5	5	5 	   L	5	5	5	5	5	5	5	5	     Sensation: Intact to LT/PP/Temp/Vibration/Position b/l throughout.     Cortical: Extinction on DSS (neglect): none    Reflexes:              Biceps(C5)       BR(C6)     Triceps(C7)               Patellar(L4)    Achilles(S1)    Plantar Resp  R	2	          2	             2		        2		    2		Down   L	2	          2	             2		        2		    2		Down     Coordination: intact rapid-alt movements. No dysmetria to FTN/HTS    Gait: Normal Romberg. No postural instability. Normal stance and tandem gait.     LABORATORY:     STUDIES & IMAGING:  Studies (EKG, EEG, EMG, etc):     Radiology (XR, CT, MR, U/S, TTE/JOHN): HPI:  73M w/ HTN, HLD, BPH s/p prostate surgery (Aquablation) on 10/4/23 presents with speech disturbance. LKN 10/7/23 at 17:30 per Ricardo (lifelong friend) when pt was noted to say "I feel cold" and that he wanted to nap a little longer, napped starting around 16:45. On 10/7/23 at 18:00, pt was noted to have a speech disturbance. At baseline, pt ambulates without any assistance or assistive devices, does all ADLs independently, speaking normally. Not on AC/AP, was discharged on 10/6/23 from hospital and was on subcutaneous heparin (unclear time or dose). Of note, pt had a prostate surgery on 10/4/23, noted to have significant bleeding in his benitez, ~4000ml of blood emptied overnight on 10/6/23 and ~1500ml of blood emptied on 10/7/23.     (Stroke only)  LKN: 10/7/23 at 17:30  NIHSS: 2  preMRS: 0  Pt is not a candidate for tenecteplase due to significant bleeding  Pt is not a candidate for mechanical thrombectomy due to no large vessel occlusion on CTA    REVIEW OF SYSTEMS    A 10-system ROS was performed and is negative except for those items noted above and/or in the HPI.    PAST MEDICAL & SURGICAL HISTORY:    FAMILY HISTORY:    SOCIAL HISTORY:   T/E/D:   Occupation:   Lives with:     MEDICATIONS (HOME):  Home Medications:    MEDICATIONS  (STANDING):    MEDICATIONS  (PRN):    ALLERGIES/INTOLERANCES:  Allergies  No Known Allergies    Intolerances    VITALS & EXAMINATION:  Vital Signs Last 24 Hrs  T(C): --  T(F): --  HR: --  BP: --  BP(mean): --  RR: --  SpO2: --      General:  Constitutional: Obese Male, appears stated age, in no apparent distress including pain  Head: Normocephalic & atraumatic.  ENT: Patent ear canals, intact TM, mucus membranes moist & pink, neck supple, no lymphadenopathy.   Respiratory: Patent airway. All lung fields are clear to auscultation bilaterally.  Extremities: No cyanosis, clubbing, or edema.  Skin: No rashes, bruising, or discoloration.    Cardiovascular (>2): RRR no murmurs. Carotid pulsations symmetric, no bruits. Normal capillary beds refill, 1-2 seconds or less.     Neurological (>12):  MS: Awake, alert, oriented to person, place, situation, time. Normal affect. Follows all commands.    Language: Speech is clear, fluent with good repetition & comprehension (able to name objects___)    CNs: PERRLA (R = 3mm, L = 3mm). VFF. EOMI no nystagmus, no diplopia. V1-3 intact to LT/pinprick, well developed masseter muscles b/l. No facial asymmetry b/l, full eye closure strength b/l. Hearing grossly normal (rubbing fingers) b/l. Symmetric palate elevation in midline. Gag reflex deferred. Head turning & shoulder shrug intact b/l. Tongue midline, normal movements, no atrophy.    Fundoscopic: pale w/ sharp discs margins No vascular changes.      Motor: Normal muscle bulk & tone. No noticeable tremor or seizure. No pronator drift.              Deltoid	Biceps	Triceps	Wrist	Finger ABd	   R	5	5	5	5	5		5 	  L	5	5	5	5	5		5    	H-Flex	H-Ext	H-ABd	H-ADd	K-Flex	K-Ext	D-Flex	P-Flex  R	5	5	5	5	5	5	5	5 	   L	5	5	5	5	5	5	5	5	     Sensation: Intact to LT/PP/Temp/Vibration/Position b/l throughout.     Cortical: Extinction on DSS (neglect): none    Reflexes:              Biceps(C5)       BR(C6)     Triceps(C7)               Patellar(L4)    Achilles(S1)    Plantar Resp  R	2	          2	             2		        2		    2		Down   L	2	          2	             2		        2		    2		Down     Coordination: intact rapid-alt movements. No dysmetria to FTN/HTS    Gait: Normal Romberg. No postural instability. Normal stance and tandem gait.     LABORATORY:     STUDIES & IMAGING:  Studies (EKG, EEG, EMG, etc):     Radiology (XR, CT, MR, U/S, TTE/JOHN): HPI:  73M RH w/ HTN, HLD, BPH s/p prostate surgery (Aquablation) on 10/4/23 presents with speech disturbance. LKN 10/7/23 at 17:30 per Ricardo (lifelong friend) when pt was noted to say "I feel cold" and that he wanted to nap a little longer, napped starting around 16:45. On 10/7/23 at 18:00, pt was noted to have a speech disturbance. At baseline, pt ambulates without any assistance or assistive devices, does all ADLs independently, speaking normally. Not on AC/AP, was discharged on 10/6/23 from hospital and was on subcutaneous heparin (unclear time or dose). Of note, pt had a prostate surgery on 10/4/23, noted to have significant bleeding in his benitez, ~4000ml of blood emptied overnight on 10/6/23 and ~1500ml of blood emptied on 10/7/23.     (Stroke only)  LKN: 10/7/23 at 17:30  NIHSS: 2  preMRS: 0  Pt is not a candidate for tenecteplase due to significant bleeding  Pt is not a candidate for mechanical thrombectomy due to no large vessel occlusion on CTA    REVIEW OF SYSTEMS    A 10-system ROS was performed and is negative except for those items noted above and/or in the HPI.    PAST MEDICAL & SURGICAL HISTORY:    FAMILY HISTORY:    SOCIAL HISTORY:   T/E/D:   Occupation:   Lives with:     MEDICATIONS (HOME):  Home Medications:    MEDICATIONS  (STANDING):    MEDICATIONS  (PRN):    ALLERGIES/INTOLERANCES:  Allergies  No Known Allergies    Intolerances    VITALS & EXAMINATION:  Vital Signs Last 24 Hrs  T(C): --  T(F): --  HR: --  BP: --  BP(mean): --  RR: --  SpO2: --      General:  Constitutional: Obese Male, appears stated age, in no apparent distress including pain  Head: Normocephalic & atraumatic.  ENT: Patent ear canals, intact TM, mucus membranes moist & pink, neck supple, no lymphadenopathy.   Respiratory: Patent airway. All lung fields are clear to auscultation bilaterally.  Extremities: No cyanosis, clubbing, or edema.  Skin: No rashes, bruising, or discoloration.    Cardiovascular (>2): RRR no murmurs. Carotid pulsations symmetric, no bruits. Normal capillary beds refill, 1-2 seconds or less.     Neurological (>12):  MS: Awake, alert, oriented to person, place, situation, time. Normal affect. Follows all commands.    Language: Speech is clear, fluent with good repetition & comprehension (able to name objects___)    CNs: PERRLA (R = 3mm, L = 3mm). VFF. EOMI no nystagmus, no diplopia. V1-3 intact to LT/pinprick, well developed masseter muscles b/l. No facial asymmetry b/l, full eye closure strength b/l. Hearing grossly normal (rubbing fingers) b/l. Symmetric palate elevation in midline. Gag reflex deferred. Head turning & shoulder shrug intact b/l. Tongue midline, normal movements, no atrophy.    Fundoscopic: pale w/ sharp discs margins No vascular changes.      Motor: Normal muscle bulk & tone. No noticeable tremor or seizure. No pronator drift.              Deltoid	Biceps	Triceps	Wrist	Finger ABd	   R	5	5	5	5	5		5 	  L	5	5	5	5	5		5    	H-Flex	H-Ext	H-ABd	H-ADd	K-Flex	K-Ext	D-Flex	P-Flex  R	5	5	5	5	5	5	5	5 	   L	5	5	5	5	5	5	5	5	     Sensation: Intact to LT/PP/Temp/Vibration/Position b/l throughout.     Cortical: Extinction on DSS (neglect): none    Reflexes:              Biceps(C5)       BR(C6)     Triceps(C7)               Patellar(L4)    Achilles(S1)    Plantar Resp  R	2	          2	             2		        2		    2		Down   L	2	          2	             2		        2		    2		Down     Coordination: intact rapid-alt movements. No dysmetria to FTN/HTS    Gait: Normal Romberg. No postural instability. Normal stance and tandem gait.     LABORATORY:     STUDIES & IMAGING:  Studies (EKG, EEG, EMG, etc):     Radiology (XR, CT, MR, U/S, TTE/JOHN): HPI:  73M RH w/ HTN, HLD, BPH s/p prostate surgery (Aquablation) on 10/4/23 presents with speech disturbance. LKN 10/7/23 at 17:30 per Ricardo (lifelong friend) when pt was noted to say "I feel cold" and that he wanted to nap a little longer, napped starting around 16:45. On 10/7/23 at 18:00, pt was noted to have a speech disturbance. At baseline, pt ambulates without any assistance or assistive devices, does all ADLs independently, speaking normally. Not on AC/AP, on atorvastatin 10mg daily, was discharged on 10/6/23 from hospital and was on subcutaneous heparin (unclear time or dose). Of note, pt had a prostate surgery on 10/4/23, noted to have significant bleeding in his benitez, ~4000ml of blood emptied overnight on 10/6/23 and ~1500ml of blood emptied on 10/7/23. In ED, , 1000ml of blood in benitez, Tmax 101.1    (Stroke only)  LKN: 10/7/23 at 17:30  NIHSS: 2  preMRS: 0  Pt is not a candidate for tenecteplase due to significant bleeding, recent surgery  Pt is not a candidate for mechanical thrombectomy due to no large vessel occlusion on CTA    REVIEW OF SYSTEMS    A 10-system ROS was performed and is negative except for those items noted above and/or in the HPI.    PAST MEDICAL & SURGICAL HISTORY:    FAMILY HISTORY:    SOCIAL HISTORY:   T/E/D:       MEDICATIONS (HOME):  Home Medications:  diovan 320mg  atovastatin 10mg  finasteride 5mg    MEDICATIONS  (STANDING):    MEDICATIONS  (PRN):    ALLERGIES/INTOLERANCES:  Allergies  No Known Allergies    Intolerances    VITALS & EXAMINATION:  Vital Signs Last 24 Hrs  T(C): 38.3 (08 Oct 2023 00:45), Max: 38.4 (07 Oct 2023 20:45)  T(F): 100.9 (08 Oct 2023 00:45), Max: 101.1 (07 Oct 2023 20:45)  HR: 93 (08 Oct 2023 00:45) (82 - 108)  BP: 150/72 (08 Oct 2023 00:45) (150/72 - 178/91)  BP(mean): 93 (08 Oct 2023 00:45) (93 - 116)  RR: 16 (07 Oct 2023 21:15) (16 - 28)  SpO2: 96% (07 Oct 2023 21:15) (96% - 99%)    Parameters below as of 07 Oct 2023 21:15  Patient On (Oxygen Delivery Method): room air    General:  Constitutional: Obese Male, appears stated age, pt shivering, repeating "I am cold"  Head: Normocephalic & atraumatic.  Respiratory: No increased work of breathing at rest  Extremities: No cyanosis, clubbing, or edema.  Skin: No rashes, bruising, or discoloration.    Neurological (>12):  MS: Awake, alert, tracking, oriented to person, place, not to time. Normal affect     Language: Speech is clear, fluent, unable to repeat, unable to name, follows all axial commands however unable to show 2 fingers. Word salad. Able to answer yes/no questions. Able to answer some questions appropriately with 1-2 words.     CNs: PERRL (R = 3mm, L = 3mm). BTT b/l. EOMI no nystagmus. V1-3 intact to LT, well developed masseter muscles b/l. No facial asymmetry b/l. Hearing grossly normal (rubbing fingers) b/l. Head turning & shoulder shrug intact b/l. Tongue midline, normal movements, no atrophy.    Motor: Normal muscle bulk & tone. No noticeable tremor or seizure. No pronator drift.              Deltoid	Biceps	Triceps	Wrist	Finger ABd	   R	5	5	5	5			5 	  L	5	5	5	5			5    	H-Flex	K-Flex	K-Ext	D-Flex	P-Flex  R	5	5	5	5	5	   L	5	5	5	5	5	     Sensation: Intact to LT b/l throughout.     Cortical: Extinction on DSS (neglect): none    Reflexes: no ankle clonus              Biceps(C5)       BR(C6)     Triceps(C7)               Patellar(L4)    Achilles(S1)    Plantar Resp  R	3	          3             3		        3		    2		Down   L	3	          3	             3		        3		    2		Down     Coordination: No dysmetria to FTN    Gait: unable to assess    LABORATORY:                        11.4   14.30 )-----------( 198      ( 07 Oct 2023 20:31 )             32.5       10-07    134<L>  |  96  |  8   ----------------------------<  118<H>  3.8   |  21<L>  |  1.02    Ca    10.5      07 Oct 2023 20:19    TPro  6.4  /  Alb  3.7  /  TBili  0.6  /  DBili  x   /  AST  15  /  ALT  12  /  AlkPhos  73  10-07       LIVER FUNCTIONS - ( 07 Oct 2023 20:19 )  Alb: 3.7 g/dL / Pro: 6.4 g/dL / ALK PHOS: 73 U/L / ALT: 12 U/L / AST: 15 U/L / GGT: x                Urinalysis Basic - ( 07 Oct 2023 21:32 )    Color: DARK RED / Appearance: Turbid / S.021 / pH: x  Gluc: x / Ketone: Trace  / Bili: Small / Urobili: 4 mg/dL   Blood: x / Protein: 300 mg/dL / Nitrite: Negative   Leuk Esterase: Negative / RBC: 93 /hpf / WBC 0 /HPF   Sq Epi: x / Non Sq Epi: x / Bacteria: Negative      POCT Blood Glucose.: 136 mg/dL (07 Oct 2023 20:06)    RADIOLOGY & ADDITIONAL TESTS:        < from: CT Brain Perfusion Maps Stroke (10.07.23 @ 20:56) >  CT PERFUSION BRAIN:  No evidenceof territorial infarct or ischemia.    CTA HEAD:  No flow-limiting stenosis, occlusion or aneurysm.    CTA NECK:  No flow-limiting stenosis, occlusion or dissection.    1.5 cm increased density lesion left tracheoesophageal groove at the T2   level. Differential includes ectopic thyroid tissue as well as   parathyroid adenoma. Suggest nonemergent thyroid/parathyroid workup   including ultrasound.    < end of copied text >  < from: CT Brain Stroke Protocol (10.07.23 @ 20:56) >  IMPRESSION:  No acute intracranial findings.    < end of copied text >

## 2023-10-07 NOTE — ED PROVIDER NOTE - PHYSICAL EXAMINATION
General: NAD  HEENT: NCAT, PERRL  Cardiac: RRR, no murmurs, 2+ peripheral pulses  Chest: CTA  Abdomen: soft, non-distended, bowel sounds present, no ttp, no rebound or guarding  Extremities: no peripheral edema, calf tenderness, or leg size discrepancies  Skin: no rashes  Neuro: Expressive aphasia. AAOx4, 5+motor BUE BLE, sensory grossly intact  Psych: mood and affect appropriate

## 2023-10-07 NOTE — ED PROVIDER NOTE - CLINICAL SUMMARY MEDICAL DECISION MAKING FREE TEXT BOX
Impression: 72 yo male pmhx of Hypertension comes to the ED w/ recent prostate ablation 3 days ago comes in with R arm weakness, inability to ambulate, and aphasia since 5:30pm. Their symptoms and exam findings are concerning for TIA, stroke.    Ordered labs, imaging, medications for diagnosis, management, and treatment.

## 2023-10-07 NOTE — ED ADULT TRIAGE NOTE - PAIN: PRESENCE, MLM
[Time Spent: ___ minutes] : I have spent [unfilled] minutes of time on the encounter.
non-verbal indicators absent (Rating = 0)

## 2023-10-07 NOTE — CONSULT NOTE ADULT - ATTENDING COMMENTS
I reviewed available diagnostic studies, and reviewed images personally. I agree with resident & fellow 's history, exam, orders placed, and plan of care. Aphasia symptom does fluctuate. Also with fever, leukocytosis and tachycardia. Pt also was hypotensive and anemic post op requiring 3u pRBC. Possible ischemia related to aforementioned complications vs undetected cardioembolism - hematuria management per urology, MRI pending, no urgent need for antithrombotics/anticoag. CTA wo significant stenosis or occlusion. ECHO pending. Rec perfusion, IVF, goal -180. I reviewed available diagnostic studies, and reviewed images personally. I agree with resident & fellow 's history, exam, orders placed, and plan of care. Aphasia symptom does fluctuate. Also with fever, leukocytosis and tachycardia. Pt also was hypotensive and anemic post op requiring 3u pRBC. Possible ischemia related to aforementioned complications vs undetected cardioembolism - hematuria management per urology, MRI pending, hold off & no urgent need for antithrombotics/anticoag also given gross hematuria. SCD DVT ppx. CTA wo significant stenosis or occlusion. ECHO pending. Rec perfusion, IVF, goal -180. I reviewed available diagnostic studies, and reviewed images personally. I agree with resident & fellow 's history, exam, orders placed, and plan of care. Aphasia symptom does fluctuate. Also with fever, leukocytosis and tachycardia. Pt also was hypotensive and anemic post op requiring 3u pRBC. Possible ischemia related to aforementioned complications vs undetected cardioembolism - hematuria management per urology, MRI pending, hold off & no urgent need for antithrombotics/anticoag also given gross hematuria. SCD DVT ppx. LDL 50 within goal no need for aggressive statin therapy. CTA wo significant stenosis or occlusion. ECHO pending. Rec perfusion, IVF, goal -180. I reviewed available diagnostic studies, and reviewed images personally. I agree with resident & fellow 's history, exam, orders placed, and plan of care. Aphasia symptom does fluctuate. Also with fever, leukocytosis and tachycardia. Pt also was hypotensive and anemic post op requiring 3u pRBC. Possible ischemia related to aforementioned complications vs undetected cardioembolism - hematuria management per urology, MRI pending, hold off & no urgent need for antithrombotics/anticoag also given gross hematuria. SCD DVT ppx. LDL 50 within goal no need for aggressive statin therapy. CTA wo significant stenosis or occlusion. ECHO pending. Rec perfusion, IVF, goal -180. Service provided 10/8/2023.

## 2023-10-07 NOTE — CONSULT NOTE ADULT - ASSESSMENT
Impression:    Recommendations:  []  73M RH w/ HTN, HLD, BPH s/p prostate surgery (Aquablation) on 10/4/23 presents with speech disturbance. LKN 10/7/23 at 17:30. On 10/7/23 at 18:00, pt was noted to have a speech disturbance. Of note, pt had a prostate surgery on 10/4/23, noted to have significant bleeding in his benitez, ~4000ml of blood emptied overnight on 10/6/23 and ~1500ml of blood emptied on 10/7/23. In ED, , 1000ml of blood in benitez, Tmax 101.1    NIHSS: 2  preMRS: 0  Pt is not a candidate for tenecteplase due to significant bleeding, recent surgery  Pt is not a candidate for mechanical thrombectomy due to no large vessel occlusion on CTA    Impression: mixed aphasia (fluent, expressive >receptive) possibly 2/2 toxic/metabolic/infectious etiology given fever, r/o stroke, less likely focal seizure    Recommendations:  [] toxic/metabolic/infectious workup per primary team  [] consider IVFs  [] MRI brain w/w/o  [] rEEG  [] no AC/AP at this time given significant bleeding  [] rest of care per primary team    Case discussed [facility] stroke fellow Dr. Karthikeyan Mckeon who discussed with Dr. Javed  Case to be seen and discussed with attending in AM

## 2023-10-07 NOTE — CONSULT NOTE ADULT - NSCONSULTADDITIONALINFOA_GEN_ALL_CORE
Neurovascular Fellow Attestation    73M with word finding difficulty. PMH of HTN, HLD, BPH and recent aquablation, complicated with anemia requiring 3 units PRBC and discharged from OSH on 10/6 presented with sudden onset word finding difficulty on 10/7. Unfortunately, the patient had an indwelling catheter with over significant hematuria post-operatively. NIH 2, MRS 0. Hgb 11, WBC 14. Febrile, tachycardia. He was also found to be hypotensive the day prior with systolic < 100bpm. Given his hematuria he was not a Tenecteplase candidate nor was there evidence of LVO for mechanical thrombectomy. Given the recent hypotension, with anemia, we suspect the patient to have had a recent stroke especially in the LMCA territory and would benefit from permissive hypertension with goal -200 with IV hydration of NS at 75cc/hr. Also recommend MRI wo, echo, A1c, LDL, and urgent urological evaluation for possible continued hematuria causing intravascular depletion and perfusion deficits in the Left MCA territory deeming him an unsafe candidate for antithrombotic therapy currently.      Karthikeyan Mckeon  Neurovascular Fellow Neurovascular Fellow Attestation    73M with word finding difficulty. PMH of HTN, HLD, BPH and recent aquablation, complicated with anemia requiring 3 units PRBC and discharged from OSH on 10/6 presented with sudden onset word finding difficulty on 10/7. Unfortunately, the patient had an indwelling catheter with over significant hematuria post-operatively. NIH 2, MRS 0. Hgb 11, WBC 14. Febrile, tachycardia. He was also found to be hypotensive the day prior with systolic < 100bpm. Given his hematuria he was not a Tenecteplase candidate nor was there evidence of LVO for mechanical thrombectomy. Given the recent hypotension, with anemia, we suspect the patient to have had a recent stroke especially in the LMCA territory and would benefit from permissive hypertension with goal -180 with IV hydration of NS at 75cc/hr. Also recommend MRI wo, echo, A1c, LDL, and urgent urological evaluation for possible continued hematuria causing intravascular depletion and perfusion deficits in the Left MCA territory deeming him an unsafe candidate for antithrombotic therapy currently.      Karthikeyan Mckeon  Neurovascular Fellow

## 2023-10-07 NOTE — ED PROVIDER NOTE - CARE PLAN
1 Principal Discharge DX:	Fever  Secondary Diagnosis:	Hematuria  Secondary Diagnosis:	BPH with urinary obstruction  Secondary Diagnosis:	Sepsis

## 2023-10-07 NOTE — ED PROVIDER NOTE - OBJECTIVE STATEMENT
74 yo male pmhx of Hypertension comes to the ED w/ recent prostate ablation 3 days ago comes in with R arm weakness, inability to ambulate, and aphasia since 5:30pm. Their pain/symptom is moderate, started 1 days ago, located in    , constant, non-mediating with rest, associated with AMS, hematuria. Started randomly. Denies trauma, falls, fever, headache, dizziness, syncope, shortness of breath, cough, rhinorrhea, congestion, chest pain, palpitations, abdominal pain, nausea, vomiting, diarrhea, constipation, melena, hematochezia, dysuria, urinary frequency, flank pain, skin changes, p.o. issues, issues stooling, issues with ambulation.    PCP: Dr. Sheridan Ayala, Private  Specialist:   Pharmacy: [facility] [id number].

## 2023-10-08 DIAGNOSIS — I10 ESSENTIAL (PRIMARY) HYPERTENSION: ICD-10-CM

## 2023-10-08 DIAGNOSIS — G92.8 OTHER TOXIC ENCEPHALOPATHY: ICD-10-CM

## 2023-10-08 DIAGNOSIS — N41.0 ACUTE PROSTATITIS: ICD-10-CM

## 2023-10-08 DIAGNOSIS — R50.9 FEVER, UNSPECIFIED: ICD-10-CM

## 2023-10-08 DIAGNOSIS — E78.5 HYPERLIPIDEMIA, UNSPECIFIED: ICD-10-CM

## 2023-10-08 DIAGNOSIS — R31.9 HEMATURIA, UNSPECIFIED: ICD-10-CM

## 2023-10-08 LAB
A1C WITH ESTIMATED AVERAGE GLUCOSE RESULT: 5.4 % — SIGNIFICANT CHANGE UP (ref 4–5.6)
ALBUMIN SERPL ELPH-MCNC: 2.7 G/DL — LOW (ref 3.3–5)
ALP SERPL-CCNC: 52 U/L — SIGNIFICANT CHANGE UP (ref 40–120)
ALT FLD-CCNC: 9 U/L — LOW (ref 10–45)
ANION GAP SERPL CALC-SCNC: 14 MMOL/L — SIGNIFICANT CHANGE UP (ref 5–17)
AST SERPL-CCNC: 10 U/L — SIGNIFICANT CHANGE UP (ref 10–40)
BILIRUB SERPL-MCNC: 0.5 MG/DL — SIGNIFICANT CHANGE UP (ref 0.2–1.2)
BUN SERPL-MCNC: 8 MG/DL — SIGNIFICANT CHANGE UP (ref 7–23)
CALCIUM SERPL-MCNC: 9.1 MG/DL — SIGNIFICANT CHANGE UP (ref 8.4–10.5)
CHLORIDE SERPL-SCNC: 99 MMOL/L — SIGNIFICANT CHANGE UP (ref 96–108)
CHOLEST SERPL-MCNC: 92 MG/DL — SIGNIFICANT CHANGE UP
CO2 SERPL-SCNC: 20 MMOL/L — LOW (ref 22–31)
CREAT SERPL-MCNC: 1.03 MG/DL — SIGNIFICANT CHANGE UP (ref 0.5–1.3)
EGFR: 76 ML/MIN/1.73M2 — SIGNIFICANT CHANGE UP
ESTIMATED AVERAGE GLUCOSE: 108 MG/DL — SIGNIFICANT CHANGE UP (ref 68–114)
GLUCOSE SERPL-MCNC: 99 MG/DL — SIGNIFICANT CHANGE UP (ref 70–99)
HCT VFR BLD CALC: 26.4 % — LOW (ref 39–50)
HCT VFR BLD CALC: 27.9 % — LOW (ref 39–50)
HDLC SERPL-MCNC: 27 MG/DL — LOW
HGB BLD-MCNC: 9.3 G/DL — LOW (ref 13–17)
HGB BLD-MCNC: 9.9 G/DL — LOW (ref 13–17)
LIPID PNL WITH DIRECT LDL SERPL: 50 MG/DL — SIGNIFICANT CHANGE UP
MCHC RBC-ENTMCNC: 32 PG — SIGNIFICANT CHANGE UP (ref 27–34)
MCHC RBC-ENTMCNC: 32.2 PG — SIGNIFICANT CHANGE UP (ref 27–34)
MCHC RBC-ENTMCNC: 35.2 GM/DL — SIGNIFICANT CHANGE UP (ref 32–36)
MCHC RBC-ENTMCNC: 35.5 GM/DL — SIGNIFICANT CHANGE UP (ref 32–36)
MCV RBC AUTO: 90.3 FL — SIGNIFICANT CHANGE UP (ref 80–100)
MCV RBC AUTO: 91.3 FL — SIGNIFICANT CHANGE UP (ref 80–100)
NON HDL CHOLESTEROL: 65 MG/DL — SIGNIFICANT CHANGE UP
NRBC # BLD: 0 /100 WBCS — SIGNIFICANT CHANGE UP (ref 0–0)
NRBC # BLD: 0 /100 WBCS — SIGNIFICANT CHANGE UP (ref 0–0)
PLATELET # BLD AUTO: 182 K/UL — SIGNIFICANT CHANGE UP (ref 150–400)
PLATELET # BLD AUTO: 211 K/UL — SIGNIFICANT CHANGE UP (ref 150–400)
POTASSIUM SERPL-MCNC: 3.3 MMOL/L — LOW (ref 3.5–5.3)
POTASSIUM SERPL-SCNC: 3.3 MMOL/L — LOW (ref 3.5–5.3)
PROT SERPL-MCNC: 5 G/DL — LOW (ref 6–8.3)
RBC # BLD: 2.89 M/UL — LOW (ref 4.2–5.8)
RBC # BLD: 3.09 M/UL — LOW (ref 4.2–5.8)
RBC # FLD: 14.2 % — SIGNIFICANT CHANGE UP (ref 10.3–14.5)
RBC # FLD: 14.2 % — SIGNIFICANT CHANGE UP (ref 10.3–14.5)
SODIUM SERPL-SCNC: 133 MMOL/L — LOW (ref 135–145)
TRIGL SERPL-MCNC: 71 MG/DL — SIGNIFICANT CHANGE UP
TSH SERPL-MCNC: 1.38 UIU/ML — SIGNIFICANT CHANGE UP (ref 0.27–4.2)
WBC # BLD: 15.91 K/UL — HIGH (ref 3.8–10.5)
WBC # BLD: 16.62 K/UL — HIGH (ref 3.8–10.5)
WBC # FLD AUTO: 15.91 K/UL — HIGH (ref 3.8–10.5)
WBC # FLD AUTO: 16.62 K/UL — HIGH (ref 3.8–10.5)

## 2023-10-08 PROCEDURE — 99223 1ST HOSP IP/OBS HIGH 75: CPT

## 2023-10-08 PROCEDURE — 70551 MRI BRAIN STEM W/O DYE: CPT | Mod: 26

## 2023-10-08 PROCEDURE — 74177 CT ABD & PELVIS W/CONTRAST: CPT | Mod: 26

## 2023-10-08 PROCEDURE — 99223 1ST HOSP IP/OBS HIGH 75: CPT | Mod: GC

## 2023-10-08 PROCEDURE — 71260 CT THORAX DX C+: CPT | Mod: 26

## 2023-10-08 RX ORDER — VALSARTAN 80 MG/1
1 TABLET ORAL
Refills: 0 | DISCHARGE

## 2023-10-08 RX ORDER — ACETAMINOPHEN 500 MG
1000 TABLET ORAL ONCE
Refills: 0 | Status: COMPLETED | OUTPATIENT
Start: 2023-10-08 | End: 2023-10-08

## 2023-10-08 RX ORDER — CEFEPIME 1 G/1
INJECTION, POWDER, FOR SOLUTION INTRAMUSCULAR; INTRAVENOUS
Refills: 0 | Status: DISCONTINUED | OUTPATIENT
Start: 2023-10-08 | End: 2023-10-09

## 2023-10-08 RX ORDER — FINASTERIDE 5 MG/1
1 TABLET, FILM COATED ORAL
Refills: 0 | DISCHARGE

## 2023-10-08 RX ORDER — ACETAMINOPHEN 500 MG
650 TABLET ORAL EVERY 6 HOURS
Refills: 0 | Status: DISCONTINUED | OUTPATIENT
Start: 2023-10-08 | End: 2023-10-08

## 2023-10-08 RX ORDER — ACETAMINOPHEN 500 MG
1000 TABLET ORAL ONCE
Refills: 0 | Status: DISCONTINUED | OUTPATIENT
Start: 2023-10-08 | End: 2023-10-09

## 2023-10-08 RX ORDER — ATORVASTATIN CALCIUM 80 MG/1
1 TABLET, FILM COATED ORAL
Refills: 0 | DISCHARGE

## 2023-10-08 RX ORDER — LANOLIN ALCOHOL/MO/W.PET/CERES
3 CREAM (GRAM) TOPICAL AT BEDTIME
Refills: 0 | Status: DISCONTINUED | OUTPATIENT
Start: 2023-10-08 | End: 2023-10-08

## 2023-10-08 RX ORDER — SODIUM CHLORIDE 9 MG/ML
1000 INJECTION INTRAMUSCULAR; INTRAVENOUS; SUBCUTANEOUS
Refills: 0 | Status: DISCONTINUED | OUTPATIENT
Start: 2023-10-08 | End: 2023-10-09

## 2023-10-08 RX ORDER — POTASSIUM CHLORIDE 20 MEQ
10 PACKET (EA) ORAL
Refills: 0 | Status: COMPLETED | OUTPATIENT
Start: 2023-10-08 | End: 2023-10-08

## 2023-10-08 RX ORDER — CEFEPIME 1 G/1
1000 INJECTION, POWDER, FOR SOLUTION INTRAMUSCULAR; INTRAVENOUS ONCE
Refills: 0 | Status: COMPLETED | OUTPATIENT
Start: 2023-10-08 | End: 2023-10-08

## 2023-10-08 RX ORDER — ASPIRIN/CALCIUM CARB/MAGNESIUM 324 MG
81 TABLET ORAL DAILY
Refills: 0 | Status: DISCONTINUED | OUTPATIENT
Start: 2023-10-08 | End: 2023-10-08

## 2023-10-08 RX ORDER — ATORVASTATIN CALCIUM 80 MG/1
80 TABLET, FILM COATED ORAL AT BEDTIME
Refills: 0 | Status: DISCONTINUED | OUTPATIENT
Start: 2023-10-08 | End: 2023-10-08

## 2023-10-08 RX ORDER — SODIUM CHLORIDE 9 MG/ML
1000 INJECTION, SOLUTION INTRAVENOUS
Refills: 0 | Status: DISCONTINUED | OUTPATIENT
Start: 2023-10-08 | End: 2023-10-08

## 2023-10-08 RX ORDER — CEFEPIME 1 G/1
1000 INJECTION, POWDER, FOR SOLUTION INTRAMUSCULAR; INTRAVENOUS EVERY 8 HOURS
Refills: 0 | Status: DISCONTINUED | OUTPATIENT
Start: 2023-10-08 | End: 2023-10-09

## 2023-10-08 RX ADMIN — CEFEPIME 100 MILLIGRAM(S): 1 INJECTION, POWDER, FOR SOLUTION INTRAMUSCULAR; INTRAVENOUS at 17:51

## 2023-10-08 RX ADMIN — CEFEPIME 100 MILLIGRAM(S): 1 INJECTION, POWDER, FOR SOLUTION INTRAMUSCULAR; INTRAVENOUS at 22:17

## 2023-10-08 RX ADMIN — Medication 400 MILLIGRAM(S): at 04:17

## 2023-10-08 RX ADMIN — SODIUM CHLORIDE 75 MILLILITER(S): 9 INJECTION INTRAMUSCULAR; INTRAVENOUS; SUBCUTANEOUS at 12:00

## 2023-10-08 RX ADMIN — Medication 100 MILLIEQUIVALENT(S): at 08:09

## 2023-10-08 RX ADMIN — Medication 100 MILLIEQUIVALENT(S): at 09:59

## 2023-10-08 NOTE — OCCUPATIONAL THERAPY INITIAL EVALUATION ADULT - PERTINENT HX OF CURRENT PROBLEM, REHAB EVAL
73M w/Hx of HTN, HLD, BPH s/p prostate surgery (Aquablation) on 10/4/23 presents with aphasia and hematuria into his benitez.     CT Brain IMPRESSION: No acute intracranial findings.  Pending MRI

## 2023-10-08 NOTE — PHYSICAL THERAPY INITIAL EVALUATION ADULT - ADDITIONAL COMMENTS
Prior to admission pt reports being independent of all ADL's & functional mobility without AD. Pt resides in house alone, has a partner comes throughout week, however does not live with patient. 6 steps to enter home, 6 steps to bedroom. Pt has walk in shower.

## 2023-10-08 NOTE — H&P ADULT - PROBLEM SELECTOR PLAN 2
Patient presenting with fever and leukocytosis, UA negative for bacteria s/p recent prostate procedure (aquablation)  - Levaquin 750mg IV qD for empiric coverage of deep prostatic infection/bacteremia  - F/u blood cx  - May be due to inflammation/hematoma surrounding prostate  - May be underlying cause of speech disturbance  - Nava in place, POA  - Consider urology consult in AM

## 2023-10-08 NOTE — OCCUPATIONAL THERAPY INITIAL EVALUATION ADULT - GENERAL OBSERVATIONS, REHAB EVAL
Pt received supine on stretcher, +tele, +IVL, +partner at bedside Pt received supine on stretcher, +tele, +IVL, +benitez, +partner at bedside

## 2023-10-08 NOTE — H&P ADULT - PROBLEM SELECTOR PLAN 1
Pt presenting with aphasia, unable to communicate properly verbally  - Speech and swallow eval  - Possible delirium 2/2 to prostatitis vs CVA  - Neurology following, follow recs  - Tele monitoring  - F/u MR head to r/o CVA  - Failed dysphagia, will remain NPO until swallow eval  - Hold home PO meds, including finasteride

## 2023-10-08 NOTE — CONSULT NOTE ADULT - SUBJECTIVE AND OBJECTIVE BOX
Patient is a 73y old  Male who presents with a chief complaint of R/o CVA, possible prostatitis (08 Oct 2023 04:48)    HPI:  73M with HTN, HLD, BPH s/p prostate surgery (Aquablation) on 10/4/23 presents with aphasia and hematuria into his benitez, found to be febrile 101F, WBC 15, UA with RBC 93, but no WBC, CT H/N negative, s/p Code Stroke and Neuro evaluation pending MRI Brain, ID consulted for assistance.    Patient --           prior hospital charts reviewed [  ]  primary team notes reviewed [  ]  other consultant notes reviewed [  ]    PAST MEDICAL & SURGICAL HISTORY:  History of BPH      HTN (hypertension)      HLD (hyperlipidemia)          Allergies  No Known Allergies    ANTIMICROBIALS (past 90 days)  MEDICATIONS  (STANDING):  cefepime   IVPB   100 mL/Hr IV Intermittent (10-07-23 @ 21:01)    levoFLOXacin IVPB   100 mL/Hr IV Intermittent (10-08-23 @ 06:05)        levoFLOXacin IVPB 750 every 24 hours    MEDICATIONS  (STANDING):    SOCIAL HISTORY:       FAMILY HISTORY:    REVIEW OF SYSTEMS  [  ] ROS unobtainable because:    [  ] All other systems negative except as noted below:	    Constitutional:  [ ] fever [ ] chills  [ ] weight loss  [ ] weakness  Skin:  [ ] rash [ ] phlebitis	  Eyes: [ ] icterus [ ] pain  [ ] discharge	  ENMT: [ ] sore throat  [ ] thrush [ ] ulcers [ ] exudates  Respiratory: [ ] dyspnea [ ] hemoptysis [ ] cough [ ] sputum	  Cardiovascular:  [ ] chest pain [ ] palpitations [ ] edema	  Gastrointestinal:  [ ] nausea [ ] vomiting [ ] diarrhea [ ] constipation [ ] pain	  Genitourinary:  [ ] dysuria [ ] frequency [ ] hematuria [ ] discharge [ ] flank pain  [ ] incontinence  Musculoskeletal:  [ ] myalgias [ ] arthralgias [ ] arthritis  [ ] back pain  Neurological:  [ ] headache [ ] seizures  [ ] confusion/altered mental status  Psychiatric:  [ ] anxiety [ ] depression	  Hematology/Lymphatics:  [ ] lymphadenopathy  Endocrine:  [ ] adrenal [ ] thyroid  Allergic/Immunologic:	 [ ] transplant [ ] seasonal    Vital Signs Last 24 Hrs  T(F): 99 (10-08-23 @ 05:18), Max: 101.1 (10-07-23 @ 20:45)  Vital Signs Last 24 Hrs  HR: 93 (10-08-23 @ 05:18) (82 - 108)  BP: 142/74 (10-08-23 @ 05:18) (137/75 - 178/91)  RR: 18 (10-08-23 @ 05:18)  SpO2: 99% (10-08-23 @ 05:18) (96% - 99%)  Wt(kg): --    PHYSICAL EXAM:  Constitutional: non-toxic, no distress  HEAD/EYES: anicteric, no conjunctival injection  ENT:  supple, no thrush  Cardiovascular:   normal S1, S2, no murmur, no edema  Respiratory:  clear BS bilaterally, no wheezes, no rales  GI:  soft, non-tender, normal bowel sounds  :  no benitez, no CVA tenderness  Musculoskeletal:  no synovitis, normal ROM  Neurologic: awake and alert, normal strength, no focal findings  Skin:  no rash, no erythema, no phlebitis  Heme/Onc: no lymphadenopathy   Psychiatric:  awake, alert, appropriate mood                            9.3    15.91 )-----------( 182      ( 08 Oct 2023 06:54 )             26.4   10-08    133<L>  |  99  |  8   ----------------------------<  99  3.3<L>   |  20<L>  |  1.03    Ca    9.1      08 Oct 2023 06:54    TPro  5.0<L>  /  Alb  2.7<L>  /  TBili  0.5  /  DBili  x   /  AST  10  /  ALT  9<L>  /  AlkPhos  52  10-08    Urinalysis Basic - ( 08 Oct 2023 06:54 )    Color: x / Appearance: x / SG: x / pH: x  Gluc: 99 mg/dL / Ketone: x  / Bili: x / Urobili: x   Blood: x / Protein: x / Nitrite: x   Leuk Esterase: x / RBC: x / WBC x   Sq Epi: x / Non Sq Epi: x / Bacteria: x    MICROBIOLOGY:              RADIOLOGY:  imaging below personally reviewed and agree with findings  < from: CT Angio Neck Stroke Protocol w/ IV Cont (10.07.23 @ 20:57) >  IMPRESSION:    CT PERFUSION BRAIN:  No evidenceof territorial infarct or ischemia.    CTA HEAD:  No flow-limiting stenosis, occlusion or aneurysm.    CTA NECK:  No flow-limiting stenosis, occlusion or dissection.    1.5 cm increased density lesion left tracheoesophageal groove at the T2   level. Differential includes ectopic thyroid tissue as well as   parathyroid adenoma. Suggest nonemergent thyroid/parathyroid workup   including ultrasound.    < end of copied text >  < from: CT Brain Stroke Protocol (10.07.23 @ 20:56) >  IMPRESSION:  No acute intracranial findings.    < end of copied text >   Patient is a 73y old  Male who presents with a chief complaint of R/o CVA, possible prostatitis (08 Oct 2023 04:48)    HPI:  73M with HTN, HLD, BPH s/p prostate surgery (Aquablation) on 10/4/23 presents with aphasia and hematuria into his benitez, found to be febrile 101F, WBC 15, UA with RBC 93, but no WBC, CT H/N negative, s/p Code Stroke and Neuro evaluation pending MRI Brain, ID consulted for assistance.    Patient remains aphasic, otherwise denies any other complains  Denies cough, dyspnea, chills, abdominal pain, n/v, diarrhea  Denies any dysuria or flank pain  Remains on benitez with mild blood tinge     prior hospital charts reviewed [ x ]  primary team notes reviewed [ x ]  other consultant notes reviewed [ x ]    PAST MEDICAL & SURGICAL HISTORY:  History of BPH      HTN (hypertension)      HLD (hyperlipidemia)          Allergies  No Known Allergies    ANTIMICROBIALS (past 90 days)  MEDICATIONS  (STANDING):  cefepime   IVPB   100 mL/Hr IV Intermittent (10-07-23 @ 21:01)    levoFLOXacin IVPB   100 mL/Hr IV Intermittent (10-08-23 @ 06:05)        levoFLOXacin IVPB 750 every 24 hours    MEDICATIONS  (STANDING):    SOCIAL HISTORY:   occasional ETOH, denies smoking or IVDU    FAMILY HISTORY: HTN    REVIEW OF SYSTEMS  [  ] ROS unobtainable because:    [ x ] All other systems negative except as noted below:	    Constitutional:  [ ] fever [ ] chills  [ ] weight loss  [ ] weakness [x] aphasia   Skin:  [ ] rash [ ] phlebitis	  Eyes: [ ] icterus [ ] pain  [ ] discharge	  ENMT: [ ] sore throat  [ ] thrush [ ] ulcers [ ] exudates  Respiratory: [ ] dyspnea [ ] hemoptysis [ ] cough [ ] sputum	  Cardiovascular:  [ ] chest pain [ ] palpitations [ ] edema	  Gastrointestinal:  [ ] nausea [ ] vomiting [ ] diarrhea [ ] constipation [ ] pain	  Genitourinary:  [ ] dysuria [ ] frequency [ x] hematuria [ ] discharge [ ] flank pain  [ ] incontinence   Musculoskeletal:  [ ] myalgias [ ] arthralgias [ ] arthritis  [ ] back pain  Neurological:  [ ] headache [ ] seizures  [ ] confusion/altered mental status  Psychiatric:  [ ] anxiety [ ] depression	  Hematology/Lymphatics:  [ ] lymphadenopathy  Endocrine:  [ ] adrenal [ ] thyroid  Allergic/Immunologic:	 [ ] transplant [ ] seasonal    Vital Signs Last 24 Hrs  T(F): 99 (10-08-23 @ 05:18), Max: 101.1 (10-07-23 @ 20:45)  Vital Signs Last 24 Hrs  HR: 93 (10-08-23 @ 05:18) (82 - 108)  BP: 142/74 (10-08-23 @ 05:18) (137/75 - 178/91)  RR: 18 (10-08-23 @ 05:18)  SpO2: 99% (10-08-23 @ 05:18) (96% - 99%)  Wt(kg): --    Physical Exam:  Constitutional:  aphasia, comfortable  Head/Eyes: no icterus  ENT:  supple, no cervical lymphadenopathy   LUNGS:  CTA  CVS:  regular rhythm, no murmur  Abd:  soft, non-tender; non-distended  : +benitez  Ext:  no edema  Vascular:  IV site no erythema tenderness or discharge  MSK:  joints without swelling  Neuro: AAO X 3, aphasia                               9.3    15.91 )-----------( 182      ( 08 Oct 2023 06:54 )             26.4   10-08    133<L>  |  99  |  8   ----------------------------<  99  3.3<L>   |  20<L>  |  1.03    Ca    9.1      08 Oct 2023 06:54    TPro  5.0<L>  /  Alb  2.7<L>  /  TBili  0.5  /  DBili  x   /  AST  10  /  ALT  9<L>  /  AlkPhos  52  10-08    Urinalysis Basic - ( 08 Oct 2023 06:54 )    Color: x / Appearance: x / SG: x / pH: x  Gluc: 99 mg/dL / Ketone: x  / Bili: x / Urobili: x   Blood: x / Protein: x / Nitrite: x   Leuk Esterase: x / RBC: x / WBC x   Sq Epi: x / Non Sq Epi: x / Bacteria: x    MICROBIOLOGY:              RADIOLOGY:  imaging below personally reviewed and agree with findings  < from: CT Angio Neck Stroke Protocol w/ IV Cont (10.07.23 @ 20:57) >  IMPRESSION:    CT PERFUSION BRAIN:  No evidenceof territorial infarct or ischemia.    CTA HEAD:  No flow-limiting stenosis, occlusion or aneurysm.    CTA NECK:  No flow-limiting stenosis, occlusion or dissection.    1.5 cm increased density lesion left tracheoesophageal groove at the T2   level. Differential includes ectopic thyroid tissue as well as   parathyroid adenoma. Suggest nonemergent thyroid/parathyroid workup   including ultrasound.    < end of copied text >  < from: CT Brain Stroke Protocol (10.07.23 @ 20:56) >  IMPRESSION:  No acute intracranial findings.    < end of copied text >   Patient is a 73y old  Male who presents with a chief complaint of R/o CVA, possible prostatitis (08 Oct 2023 04:48)    HPI:  73M with HTN, HLD, BPH s/p prostate surgery (Aquablation) on 10/4/23 presents with aphasia and hematuria into his benitez, found to be febrile 101F, WBC 15, UA with RBC 93, but no WBC, CT H/N negative, s/p Code Stroke and Neuro evaluation pending MRI Brain, ID consulted for assistance.    Patient remains aphasic, otherwise denies any other complains  Denies cough, dyspnea, chills, abdominal pain, n/v, diarrhea  Denies any dysuria or flank pain  Remains on benitez with mild blood tinge     prior hospital charts reviewed [ x ]  primary team notes reviewed [ x ]  other consultant notes reviewed [ x ]    PAST MEDICAL & SURGICAL HISTORY:  History of BPH      HTN (hypertension)      HLD (hyperlipidemia)          Allergies  No Known Allergies    ANTIMICROBIALS (past 90 days)  MEDICATIONS  (STANDING):  cefepime   IVPB   100 mL/Hr IV Intermittent (10-07-23 @ 21:01)    levoFLOXacin IVPB   100 mL/Hr IV Intermittent (10-08-23 @ 06:05)        levoFLOXacin IVPB 750 every 24 hours    MEDICATIONS  (STANDING):      SOCIAL HISTORY:   Lives with partner. No IVDA.         FAMILY HISTORY:   No family hx pf prostatitis         REVIEW OF SYSTEMS  [  ] ROS unobtainable because:    [ x ] All other systems negative except as noted below:	    Constitutional:  [x ] fever [ ] chills   Skin:  [ ] rash [ ] phlebitis	  Eyes: [ ] icterus [ ] pain  [ ] discharge	  ENMT: [ ] sore throat  [ ] thrush   Respiratory: [ ] dyspnea  [ ] cough [ ] sputum	  Cardiovascular:  [ ] chest pain [ ] palpitations [ ] edema	  Gastrointestinal:  [ ] nausea [ ] vomiting [ ] diarrhea [ ] constipation [ ] pain	  Genitourinary:  [ ] dysuria [ ] frequency [ x] hematuria [ ] discharge   Musculoskeletal:  [ ] myalgias  [ ] back pain  Neurological:  [ ] headache [ ] confusion/altered mental status [x] aphasia   Psychiatric:  [ ] anxiety [ ] depression	  Endocrine:  [ ] adrenal [ ] thyroid  Allergic/Immunologic:	 [ ] transplant [ ] seasonal        Vital Signs Last 24 Hrs  T(F): 99 (10-08-23 @ 05:18), Max: 101.1 (10-07-23 @ 20:45)  Vital Signs Last 24 Hrs  HR: 93 (10-08-23 @ 05:18) (82 - 108)  BP: 142/74 (10-08-23 @ 05:18) (137/75 - 178/91)  RR: 18 (10-08-23 @ 05:18)  SpO2: 99% (10-08-23 @ 05:18) (96% - 99%)  Wt(kg): --        Physical Exam:  Constitutional:  aphasia, comfortable  Head/Eyes: no icterus  ENT:  supple,   LUNGS: + air entry b/l   CVS:  regular rhythm,   Abd:  soft, non-tender; non-distended  : +benitez  Ext:  no edema  Vascular:  IV site no erythema tenderness or discharge  MSK:  joints without swelling  Neuro: AAO X 3, aphasia   Skin ; no rash                             9.3    15.91 )-----------( 182      ( 08 Oct 2023 06:54 )             26.4   10-08    133<L>  |  99  |  8   ----------------------------<  99  3.3<L>   |  20<L>  |  1.03    Ca    9.1      08 Oct 2023 06:54    TPro  5.0<L>  /  Alb  2.7<L>  /  TBili  0.5  /  DBili  x   /  AST  10  /  ALT  9<L>  /  AlkPhos  52  10-08    Urinalysis Basic - ( 08 Oct 2023 06:54 )    Color: x / Appearance: x / SG: x / pH: x  Gluc: 99 mg/dL / Ketone: x  / Bili: x / Urobili: x   Blood: x / Protein: x / Nitrite: x   Leuk Esterase: x / RBC: x / WBC x   Sq Epi: x / Non Sq Epi: x / Bacteria: x    MICROBIOLOGY:        RADIOLOGY:  imaging below personally reviewed and agree with findings    < from: CT Angio Neck Stroke Protocol w/ IV Cont (10.07.23 @ 20:57) >  IMPRESSION:    CT PERFUSION BRAIN:  No evidenceof territorial infarct or ischemia.    CTA HEAD:  No flow-limiting stenosis, occlusion or aneurysm.    CTA NECK:  No flow-limiting stenosis, occlusion or dissection.    1.5 cm increased density lesion left tracheoesophageal groove at the T2   level. Differential includes ectopic thyroid tissue as well as   parathyroid adenoma. Suggest nonemergent thyroid/parathyroid workup   including ultrasound.      < from: CT Brain Stroke Protocol (10.07.23 @ 20:56) >  IMPRESSION:  No acute intracranial findings.

## 2023-10-08 NOTE — H&P ADULT - HISTORY OF PRESENT ILLNESS
73M w/Hx of HTN, HLD, BPH s/p prostate surgery (Aquablation) on 10/4/23 presents with aphasia and hematuria into his benitez. Patient recently had aquablation performed by Dr. Gerardo Arreaga at [facility]. Was having hematuria after his procedure into his benitez bag as was expected post-op and was sent home with benitez and discharged Friday night from [facility]. Benitez was to be removed on monday. yesterday, patient arose from a nap and was unable to communicate with any proper speech, began speaking garbled speech and his partner called his doctor who advised for him to go to the nearest hospital. In ED, code stroke called to r/o stroke. Pt CTs negative but found to be febrile. Pt not on any home AC. Had history of a stutter in the past that had resolved. Pt unable to speak properly but able to follow commands and demonstrates understanding. Pt is lethargic. Patient denies chest pain, SOB, headache, dizziness, abd pain, nausea, vomiting.

## 2023-10-08 NOTE — CONSULT NOTE ADULT - ASSESSMENT
patient 1 week sp aquablation with post op gross hematuria with indwelling benitez and fever   post op cva    benitez to sd and cbi  urine culture    would NOT change benitez and ? transfer to [facility] for post op complication management  antibiotics for post op infection management     would not dc benitez during NS stay   AC not contra indicated if required for cva but will require cbi and monitoring of gross hematuria.      Further gu castaneda and management should be guided by his operativ surgeon at [facility].

## 2023-10-08 NOTE — H&P ADULT - ASSESSMENT
73M w/Hx of HTN, HLD, BPH s/p prostate surgery (Aquablation) on 10/4/23 presents with aphasia and hematuria into his benitez.

## 2023-10-08 NOTE — PHYSICAL THERAPY INITIAL EVALUATION ADULT - FOLLOWS COMMANDS/ANSWERS QUESTIONS, REHAB EVAL
Pt following 75% of 1 step in context commands with visual cues. Pt following verbal cues 25% of the times. Pt with expressive and receptive aphasia.

## 2023-10-08 NOTE — CONSULT NOTE ADULT - ATTENDING COMMENTS
73M with HTN, HLD, BPH s/p prostate surgery (Aquablation) on 10/4/23 presents with aphasia and hematuria into his benitez, found to be febrile 101F, WBC 15, UA with RBC 93, but no WBC, CT H/N negative, s/p Code Stroke and Neuro evaluation pending MRI Brain, ID consulted for assistance.    Patient remains aphasic, otherwise denies any other complains  Denies cough, dyspnea, chills, abdominal pain, n/v, diarrhea  Denies any dysuria or flank pain  Remains on benitez with mild blood tinge     Prior UCx 9/20/23 with Citrobacter freudii S fluoroquinolone, bactrim, cefepime    DIAGNOSIS and IMPRESSION:  #Fever, leucocytosis, tachycardia, sepsis ? etiology   #Aphasia   #Hematuria    - significant aphasia, but now appears to have improved  - MRI negative for CVA  - symptoms related to recent prostate ablation therapy?  - no urinary symptoms aside from exam showing gross hematuria      RECOMMENDATIONS:  - stop Levaquin, switch to Cefepime 1G q8 empirically   - trend WBC, + leucocytosis   - obtain CT chest  - obtain CT AP prefer with contrast if feasible to look for occult source of infection   - obtain RVP  - follow up BCx x2, UCx  - Urology eval  - Neuro on board.       Az Wallace  Please contact through MS Teams   If no response or past 5 pm/weekend call [phone].

## 2023-10-08 NOTE — OCCUPATIONAL THERAPY INITIAL EVALUATION ADULT - NS ASR FOLLOW COMMAND OT EVAL
pt able to follow 75% of 1-step in context commands with visual demo. Pt only able to follow ~25% of verbal commands.

## 2023-10-08 NOTE — H&P ADULT - TIME BILLING
Difficult to communicate with patient, alternative history taken from patient's partner at bedside. Patient with recent procedure.

## 2023-10-08 NOTE — CONSULT NOTE ADULT - SUBJECTIVE AND OBJECTIVE BOX
HPI  Patient is confused and request I ask his fiance for medical history but she is not currently available.  He states he ad a "prostate ablation at [facility] 4 days ago" now with indwelling benitez and gross hematuria.  Per chart review he has a pmh of  HTN, HLD, BPH s/p prostate surgery (Aquablation) on 10/4/23 presented to [facility] ED  with aphasia and hematuria into his benitez. Patient recently had aquablation performed by Dr. Gerardo Arreaga at [facility]. Was having hematuria after his procedure into his benitez bag as was expected post-op and was sent home with benitez and discharged Friday night from [facility]. Benitez was to be removed on monday, patient arose from a nap and was unable to communicate with any proper speech, began speaking garbled speech and his partner called his doctor who advised for him to go to the nearest hospital. In ED, code stroke called to r/o stroke. Pt CTs negative but found to be febrile. Pt not on any home AC. Had history of a stutter in the past that had resolved.    PAST MEDICAL & SURGICAL HISTORY:  History of BPH      HTN (hypertension)      HLD (hyperlipidemia)    MEDICATIONS  (STANDING):  levoFLOXacin IVPB 750 milliGRAM(s) IV Intermittent every 24 hours  sodium chloride 0.45%. 1000 milliLiter(s) (75 mL/Hr) IV Continuous <Continuous>    Allergies    No Known Allergies    SOCIAL HISTORY: no tobacco etoh    REVIEW OF SYSTEMS: patient is unable to communicate ros or hisotry    Physical Exam  Vital signs  T(C): 37 (10-08-23 @ 09:26), Max: 38.4 (10-07-23 @ 20:45)  HR: 76 (10-08-23 @ 09:26)  BP: 127/68 (10-08-23 @ 09:26)  SpO2: 99% (10-08-23 @ 09:26)    Gen:  pateitn awake wih aphasia   heent ncat  chest clear effort  cor reg  abd soft ndntno cvat  gu indwelling benitez with gross hematuria     LABS:      10-08 @ 06:54    WBC 15.91 / Hct 26.4  / SCr 1.03     10-07 @ 20:31    WBC 14.30 / Hct 32.5  / SCr --       10-08    133<L>  |  99  |  8   ----------------------------<  99  3.3<L>   |  20<L>  |  1.03    Ca    9.1      08 Oct 2023 06:54    TPro  5.0<L>  /  Alb  2.7<L>  /  TBili  0.5  /  DBili  x   /  AST  10  /  ALT  9<L>  /  AlkPhos  52  10-08      Urinalysis Basic - ( 08 Oct 2023 06:54 )    Color: x / Appearance: x / SG: x / pH: x  Gluc: 99 mg/dL / Ketone: x  / Bili: x / Urobili: x   Blood: x / Protein: x / Nitrite: x   Leuk Esterase: x / RBC: x / WBC x   Sq Epi: x / Non Sq Epi: x / Bacteria: x    Urine Cx: na  Blood Cx: na

## 2023-10-08 NOTE — CONSULT NOTE ADULT - ASSESSMENT
WORK UP:      ANTIBIOTIC:      DIAGNOSIS and IMPRESSION:        RECOMMENDATIONS:        Above recommendations are Preliminary until Attending's Addendum which includes Final Recommendations      Oseas Savage DO, PGY-5   ID fellow  Nidia Teams Preferred  After 5pm/weekends call [phone]   73M with HTN, HLD, BPH s/p prostate surgery (Aquablation) on 10/4/23 presents with aphasia and hematuria into his benitez, found to be febrile 101F, WBC 15, UA with RBC 93, but no WBC, CT H/N negative, s/p Code Stroke and Neuro evaluation pending MRI Brain, ID consulted for assistance.    Patient remains aphasic, otherwise denies any other complains  Denies cough, dyspnea, chills, abdominal pain, n/v, diarrhea  Denies any dysuria or flank pain  Remains on benitez with mild blood tinge     Prior UCx 9/20/23 with Citrobacter freudii S fluoroquinolone, bactrim, cefepime    DIAGNOSIS and IMPRESSION:  #Fever  #Aphasia   #Hematuria    - significant aphasia, concerning for CVA  - symptoms related to recent prostate ablation therapy?  - no urinary symptoms aside from exam showing gross hematuria    RECOMMENDATIONS:  - continue empiric Levaquin  - monitor temperature curve  - trend WBC  - obtain CT AP prefer with contrast  - obtain RVP  - follow up BCx x2, UCx  - Urology eval  - Neuro, MRI pending       Above recommendations are Preliminary until Attending's Addendum which includes Final Recommendations      Oseas Savage DO, PGY-5   ID fellow  Nidia Teams Preferred  After 5pm/weekends call [phone]   73M with HTN, HLD, BPH s/p prostate surgery (Aquablation) on 10/4/23 presents with aphasia and hematuria into his benitez, found to be febrile 101F, WBC 15, UA with RBC 93, but no WBC, CT H/N negative, s/p Code Stroke and Neuro evaluation pending MRI Brain, ID consulted for assistance.    Patient remains aphasic, otherwise denies any other complains  Denies cough, dyspnea, chills, abdominal pain, n/v, diarrhea  Denies any dysuria or flank pain  Remains on benitez with mild blood tinge     Prior UCx 9/20/23 with Citrobacter freudii S fluoroquinolone, bactrim, cefepime    DIAGNOSIS and IMPRESSION:  #Fever  #Aphasia   #Hematuria    - significant aphasia, concerning for CVA  - symptoms related to recent prostate ablation therapy?  - no urinary symptoms aside from exam showing gross hematuria  - fever ?cva v ?concern for prostatitis with recent procedure    RECOMMENDATIONS:  - continue empiric Levaquin   - monitor temperature curve  - trend WBC  - obtain CT AP prefer with contrast  - obtain RVP  - follow up BCx x2, UCx  - Urology eval  - Neuro, MRI pending       Above recommendations are Preliminary until Attending's Addendum which includes Final Recommendations      Oseas Savage DO, PGY-5   ID fellow  Nidia Teams Preferred  After 5pm/weekends call [phone]   73M with HTN, HLD, BPH s/p prostate surgery (Aquablation) on 10/4/23 presents with aphasia and hematuria into his benitez, found to be febrile 101F, WBC 15, UA with RBC 93, but no WBC, CT H/N negative, s/p Code Stroke and Neuro evaluation pending MRI Brain, ID consulted for assistance.    Patient remains aphasic, otherwise denies any other complains  Denies cough, dyspnea, chills, abdominal pain, n/v, diarrhea  Denies any dysuria or flank pain  Remains on benitez with mild blood tinge     Prior UCx 9/20/23 with Citrobacter freudii S fluoroquinolone, bactrim, cefepime    DIAGNOSIS and IMPRESSION:  #Fever  #Aphasia   #Hematuria    - significant aphasia, concerning for CVA  - symptoms related to recent prostate ablation therapy?  - no urinary symptoms aside from exam showing gross hematuria  - fever ?cva     RECOMMENDATIONS:  - stop Levaquin, monitor off antibiotic  - if decompensates, can start empiric abx   - monitor temperature curve  - trend WBC  - obtain CT chest  - obtain CT AP prefer with contrast  - obtain RVP  - follow up BCx x2, UCx  - Urology eval  - Neuro, MRI pending     Case d/w attending and primary team        Oseas Savage DO, PGY-5   ID fellow  Nidia Teams Preferred  After 5pm/weekends call [phone]   73M with HTN, HLD, BPH s/p prostate surgery (Aquablation) on 10/4/23 presents with aphasia and hematuria into his benitez, found to be febrile 101F, WBC 15, UA with RBC 93, but no WBC, CT H/N negative, s/p Code Stroke and Neuro evaluation pending MRI Brain, ID consulted for assistance.    Patient remains aphasic, otherwise denies any other complains  Denies cough, dyspnea, chills, abdominal pain, n/v, diarrhea  Denies any dysuria or flank pain  Remains on benitez with mild blood tinge     Prior UCx 9/20/23 with Citrobacter freudii S fluoroquinolone, bactrim, cefepime    DIAGNOSIS and IMPRESSION:  #Fever  #Aphasia   #Hematuria    - significant aphasia, but now appears to have improved  - MRI negative for CVA  - symptoms related to recent prostate ablation therapy?  - no urinary symptoms aside from exam showing gross hematuria    RECOMMENDATIONS:  - stop Levaquin, switch to Cefepime   - monitor temperature curve  - trend WBC  - obtain CT chest  - obtain CT AP prefer with contrast  - obtain RVP  - follow up BCx x2, UCx  - Urology eval  - Neuro    Case d/w attending and primary team        Oseas Savage DO, PGY-5   ID fellow  Nidia Teams Preferred  After 5pm/weekends call [phone]   73M with HTN, HLD, BPH s/p prostate surgery (Aquablation) on 10/4/23 presents with aphasia and hematuria into his benitez, found to be febrile 101F, WBC 15, UA with RBC 93, but no WBC, CT H/N negative, s/p Code Stroke and Neuro evaluation pending MRI Brain, ID consulted for assistance.    Patient remains aphasic, otherwise denies any other complains  Denies cough, dyspnea, chills, abdominal pain, n/v, diarrhea  Denies any dysuria or flank pain  Remains on benitez with mild blood tinge     Prior UCx 9/20/23 with Citrobacter freudii S fluoroquinolone, bactrim, cefepime    DIAGNOSIS and IMPRESSION:  #Fever  #Aphasia   #Hematuria    - significant aphasia, but now appears to have improved  - MRI negative for CVA  - symptoms related to recent prostate ablation therapy?  - no urinary symptoms aside from exam showing gross hematuria    RECOMMENDATIONS:  - stop Levaquin, switch to Cefepime 1G q8  - monitor temperature curve  - trend WBC  - obtain CT chest  - obtain CT AP prefer with contrast  - obtain RVP  - follow up BCx x2, UCx  - Urology eval  - Neuro    Case d/w attending and primary team        Oseas Savage DO, PGY-5   ID fellow  Nidia Teams Preferred  After 5pm/weekends call [phone]

## 2023-10-08 NOTE — PHYSICAL THERAPY INITIAL EVALUATION ADULT - PERTINENT HX OF CURRENT PROBLEM, REHAB EVAL
73M w/Hx of HTN, HLD, BPH s/p prostate surgery (Aquablation) on 10/4/23 presents with aphasia and hematuria into his benitez. Per H&P pt with toxic metabolic encephalopathy.    CT PERFUSION BRAIN: No evidence of territorial infarct or ischemia. CTA HEAD: No flow-limiting stenosis, occlusion or aneurysm. CTA NECK: No flow-limiting stenosis, occlusion or dissection.

## 2023-10-08 NOTE — H&P ADULT - NSHPPHYSICALEXAM_GEN_ALL_CORE
T(C): 38.3 (10-08-23 @ 03:50), Max: 38.4 (10-07-23 @ 20:45)  HR: 97 (10-08-23 @ 03:50) (82 - 108)  BP: 137/75 (10-08-23 @ 03:50) (137/75 - 178/91)  RR: 18 (10-08-23 @ 03:50) (16 - 28)  SpO2: 97% (10-08-23 @ 03:50) (96% - 99%)    CONSTITUTIONAL: No apparent distress  EYES: PERRLA and symmetric, EOMI, No conjunctival or scleral injection, non-icteric  ENMT: Oral mucosa with moist membranes.  NECK: Supple, symmetric. No JVD.  RESP: No respiratory distress, no use of accessory muscles; CTA b/l, no WRR  CV: RRR, +S1S2, no MRG  GI: Soft, NT, ND, no rebound, no guarding  : No suprapubic tenderness. No CVA tenderness. Nava in place, POA, draining sanguinous urine  LYMPH: No cervical LAD or tenderness  MSK: No spinal tenderness, normal muscle strength/tone  EXTREMITIES: No pedal edema  SKIN: No rashes or ulcers noted  NEURO: Responsive. No tremor, sensation intact in upper and lower extremities b/l. Communicates in unintelligible language, but understands commands.  PSYCH: Unable to assess

## 2023-10-09 ENCOUNTER — TRANSCRIPTION ENCOUNTER (OUTPATIENT)
Age: 73
End: 2023-10-09

## 2023-10-09 VITALS
SYSTOLIC BLOOD PRESSURE: 155 MMHG | OXYGEN SATURATION: 96 % | DIASTOLIC BLOOD PRESSURE: 84 MMHG | HEART RATE: 73 BPM | TEMPERATURE: 98 F | RESPIRATION RATE: 18 BRPM

## 2023-10-09 LAB
ALBUMIN SERPL ELPH-MCNC: 2.7 G/DL — LOW (ref 3.3–5)
ALP SERPL-CCNC: 53 U/L — SIGNIFICANT CHANGE UP (ref 40–120)
ALT FLD-CCNC: 10 U/L — SIGNIFICANT CHANGE UP (ref 10–45)
ANION GAP SERPL CALC-SCNC: 11 MMOL/L — SIGNIFICANT CHANGE UP (ref 5–17)
AST SERPL-CCNC: 13 U/L — SIGNIFICANT CHANGE UP (ref 10–40)
BILIRUB SERPL-MCNC: 0.4 MG/DL — SIGNIFICANT CHANGE UP (ref 0.2–1.2)
BUN SERPL-MCNC: 10 MG/DL — SIGNIFICANT CHANGE UP (ref 7–23)
CALCIUM SERPL-MCNC: 9.8 MG/DL — SIGNIFICANT CHANGE UP (ref 8.4–10.5)
CHLORIDE SERPL-SCNC: 104 MMOL/L — SIGNIFICANT CHANGE UP (ref 96–108)
CO2 SERPL-SCNC: 22 MMOL/L — SIGNIFICANT CHANGE UP (ref 22–31)
CREAT SERPL-MCNC: 0.97 MG/DL — SIGNIFICANT CHANGE UP (ref 0.5–1.3)
CULTURE RESULTS: NO GROWTH — SIGNIFICANT CHANGE UP
EGFR: 82 ML/MIN/1.73M2 — SIGNIFICANT CHANGE UP
GLUCOSE SERPL-MCNC: 94 MG/DL — SIGNIFICANT CHANGE UP (ref 70–99)
HCT VFR BLD CALC: 28.2 % — LOW (ref 39–50)
HCT VFR BLD CALC: 30.5 % — LOW (ref 39–50)
HCV AB S/CO SERPL IA: 0.11 S/CO — SIGNIFICANT CHANGE UP (ref 0–0.99)
HCV AB SERPL-IMP: SIGNIFICANT CHANGE UP
HGB BLD-MCNC: 10.4 G/DL — LOW (ref 13–17)
HGB BLD-MCNC: 9.6 G/DL — LOW (ref 13–17)
MCHC RBC-ENTMCNC: 31.8 PG — SIGNIFICANT CHANGE UP (ref 27–34)
MCHC RBC-ENTMCNC: 31.9 PG — SIGNIFICANT CHANGE UP (ref 27–34)
MCHC RBC-ENTMCNC: 34 GM/DL — SIGNIFICANT CHANGE UP (ref 32–36)
MCHC RBC-ENTMCNC: 34.1 GM/DL — SIGNIFICANT CHANGE UP (ref 32–36)
MCV RBC AUTO: 93.4 FL — SIGNIFICANT CHANGE UP (ref 80–100)
MCV RBC AUTO: 93.6 FL — SIGNIFICANT CHANGE UP (ref 80–100)
NRBC # BLD: 0 /100 WBCS — SIGNIFICANT CHANGE UP (ref 0–0)
NRBC # BLD: 0 /100 WBCS — SIGNIFICANT CHANGE UP (ref 0–0)
PLATELET # BLD AUTO: 204 K/UL — SIGNIFICANT CHANGE UP (ref 150–400)
PLATELET # BLD AUTO: 237 K/UL — SIGNIFICANT CHANGE UP (ref 150–400)
POTASSIUM SERPL-MCNC: 4 MMOL/L — SIGNIFICANT CHANGE UP (ref 3.5–5.3)
POTASSIUM SERPL-SCNC: 4 MMOL/L — SIGNIFICANT CHANGE UP (ref 3.5–5.3)
PROT SERPL-MCNC: 5.1 G/DL — LOW (ref 6–8.3)
RAPID RVP RESULT: SIGNIFICANT CHANGE UP
RBC # BLD: 3.02 M/UL — LOW (ref 4.2–5.8)
RBC # BLD: 3.26 M/UL — LOW (ref 4.2–5.8)
RBC # FLD: 14.3 % — SIGNIFICANT CHANGE UP (ref 10.3–14.5)
RBC # FLD: 14.4 % — SIGNIFICANT CHANGE UP (ref 10.3–14.5)
SARS-COV-2 RNA SPEC QL NAA+PROBE: SIGNIFICANT CHANGE UP
SODIUM SERPL-SCNC: 137 MMOL/L — SIGNIFICANT CHANGE UP (ref 135–145)
SPECIMEN SOURCE: SIGNIFICANT CHANGE UP
WBC # BLD: 12.27 K/UL — HIGH (ref 3.8–10.5)
WBC # BLD: 14.74 K/UL — HIGH (ref 3.8–10.5)
WBC # FLD AUTO: 12.27 K/UL — HIGH (ref 3.8–10.5)
WBC # FLD AUTO: 14.74 K/UL — HIGH (ref 3.8–10.5)

## 2023-10-09 PROCEDURE — 99233 SBSQ HOSP IP/OBS HIGH 50: CPT

## 2023-10-09 RX ADMIN — CEFEPIME 100 MILLIGRAM(S): 1 INJECTION, POWDER, FOR SOLUTION INTRAMUSCULAR; INTRAVENOUS at 05:42

## 2023-10-09 NOTE — SWALLOW BEDSIDE ASSESSMENT ADULT - SLP PERTINENT HISTORY OF CURRENT PROBLEM
Spoke to Meron about gabapentin refill and relayed that we can not refilled that medication until pt is seen tomorrow at her virtual appointment with Dr Vivienne Fisher. She understood and didn't realize her  had talked to me yesterday. 73y M w/ PMHx of HTN, HLD, BPH s/p prostate surgery on 10/4/23 presents with speech disturbance. Code stroke called on arrival, NIHSS 4. CT negative, MRI brain negative. Pt found to be febrile in the ED. C/f delirium 2/2 to prostatitis vs CVA. ID and urology following. Aphasia/AMS resolved.

## 2023-10-09 NOTE — SWALLOW BEDSIDE ASSESSMENT ADULT - SLP GENERAL OBSERVATIONS
Encountered Pt sitting upright in bed on RA. Pt is A&Ox4, verbally responsive, and able to follow commands.

## 2023-10-09 NOTE — SPEECH LANGUAGE PATHOLOGY EVALUATION - SLP PERTINENT HISTORY OF CURRENT PROBLEM
73y M w/ PMHx of HTN, HLD, BPH s/p prostate surgery on 10/4/23 presents with speech disturbance. Code stroke called on arrival, NIHSS 4. CT negative, MRI brain negative. Pt found to be febrile in the ED. C/f delirium 2/2 to prostatitis vs CVA. ID and urology following. Aphasia/AMS resolved.

## 2023-10-09 NOTE — PROGRESS NOTE ADULT - REASON FOR ADMISSION
R/o CVA, possible prostatitis

## 2023-10-09 NOTE — PROGRESS NOTE ADULT - NSPROGADDITIONALINFOA_GEN_ALL_CORE
Neurovascular Fellow Attestation    73M with word finding difficulty. PMH of HTN, HLD, BPH and recent aquablation, complicated with anemia requiring 3 units PRBC and discharged from OSH on 10/6 presented with sudden onset word finding difficulty on 10/7. Unfortunately, the patient had an indwelling catheter with over significant hematuria post-operatively. NIH 2, MRS 0. Hgb 11, WBC 14. Febrile, tachycardia. He was also found to be hypotensive the day prior with systolic < 100bpm. Given his hematuria he was not a Tenecteplase candidate nor was there evidence of LVO for mechanical thrombectomy. MRI was obtain while the patient still had aphasia presented which was negative for any intracranial abnormalities. It is less likely to be due to TIA given the persistent symptoms despite MRI. Given the recent hypotension, with anemia, and significant improvement, patient’s symptoms are likely secondary to relative hypotension and toxic metabolic etiologies including leukocytosis and fever and therefore should benefit from outpatient EEG for possible focal aware seizure activity. Would benefit from follow up with primary urologist for ongoing hematuria. No further inpatient neurovascular neurological workup indicated.     Karthikeyan Mckeon  Neurovascular Fellow

## 2023-10-09 NOTE — DISCHARGE NOTE PROVIDER - NSDCCPCAREPLAN_GEN_ALL_CORE_FT
PRINCIPAL DISCHARGE DIAGNOSIS  Diagnosis: Toxic metabolic encephalopathy  Assessment and Plan of Treatment: presented with aphasia. speech therapy evaluated. mr and ct negative. follow up with neurologist as listed.      SECONDARY DISCHARGE DIAGNOSES  Diagnosis: Hematuria  Assessment and Plan of Treatment: clot noted on ct. followup with surgeon or urologist asap. dvt prophylaxis on hold    Diagnosis: Sepsis  Assessment and Plan of Treatment: likely 2/2 to acute prostatis. s/p iv antibiotics     PRINCIPAL DISCHARGE DIAGNOSIS  Diagnosis: Toxic metabolic encephalopathy  Assessment and Plan of Treatment: presented with aphasia. resolved speech therapy evaluated. mr and ct negative. follow up with neurologist as listed. Return if worsen      SECONDARY DISCHARGE DIAGNOSES  Diagnosis: Hematuria  Assessment and Plan of Treatment: clot noted on ct. followup with surgeon or urologist asap. dvt prophylaxis on hold    Diagnosis: Sepsis  Assessment and Plan of Treatment: likely 2/2 to acute prostatis. s/p iv antibiotics, ID consulted and cultures were negative. no need for further antibiotics. Keep follow up urology appointment. bring these papers with you. Also follow up with pcp within 1 week.    Diagnosis: Thyroid nodule  Assessment and Plan of Treatment: 1.5 cm increased density lesion left tracheoesophageal groove at the T2   level. Differential includes ectopic thyroid tissue as well as   parathyroid adenoma. Suggest nonemergent thyroid/parathyroid workup   including ultrasound.  follow up with pcp

## 2023-10-09 NOTE — PROGRESS NOTE ADULT - SUBJECTIVE AND OBJECTIVE BOX
73yPatient is a 73y old  Male who presents with a chief complaint of R/o CVA, possible prostatitis (09 Oct 2023 14:55)      Interval history:  Afebrile, speech now normal.       Allergies:   No Known Allergies      Antimicrobials:      REVIEW OF SYSTEMS:  No chest pain   No SOB  No N/V  No rash.       Vital Signs Last 24 Hrs  T(C): 36.7 (10-09-23 @ 11:10), Max: 36.9 (10-08-23 @ 21:02)  T(F): 98.1 (10-09-23 @ 11:10), Max: 98.4 (10-08-23 @ 21:02)  HR: 73 (10-09-23 @ 11:10) (67 - 82)  BP: 155/84 (10-09-23 @ 11:10) (114/76 - 155/84)  BP(mean): --  RR: 18 (10-09-23 @ 11:10) (18 - 18)  SpO2: 96% (10-09-23 @ 11:10) (96% - 99%)      PHYSICAL EXAM:  Pt in no acute distress, alert, awake.   breathing comfortably.   non distended abdomen  + benitez   no edema LE   no phlebitis                             10.4   14.74 )-----------( 237      ( 09 Oct 2023 15:30 )             30.5   10-09    137  |  104  |  10  ----------------------------<  94  4.0   |  22  |  0.97    Ca    9.8      09 Oct 2023 06:52    TPro  5.1<L>  /  Alb  2.7<L>  /  TBili  0.4  /  DBili  x   /  AST  13  /  ALT  10  /  AlkPhos  53  10-09      LIVER FUNCTIONS - ( 09 Oct 2023 06:52 )  Alb: 2.7 g/dL / Pro: 5.1 g/dL / ALK PHOS: 53 U/L / ALT: 10 U/L / AST: 13 U/L / GGT: x               Culture - Blood (collected 07 Oct 2023 22:10)  Source: .Blood Blood  Preliminary Report (09 Oct 2023 02:02):    No growth at 24 hours    Culture - Blood (collected 07 Oct 2023 21:55)  Source: .Blood Blood  Preliminary Report (09 Oct 2023 02:02):    No growth at 24 hours    Culture - Urine (collected 07 Oct 2023 21:32)  Source: Clean Catch Clean Catch (Midstream)  Final Report (09 Oct 2023 07:58):    No growth        Radiology:      < from: CT Abdomen and Pelvis w/ IV Cont (10.08.23 @ 20:50) >  IMPRESSION:    Enlarged and heterogeneous prostate. Ill-defined low-density focus within   the left the prostate, which could reflect background changes related to   BPH or postprocedural changes from the recent ablationprocedure.   Infection with phlegmon or developing abscess is not entirely excluded.    Mildly distended urinary bladder with Benitez catheter in place. Amorphous   hyperdensity encompassing the Benitez balloon, consistent with blood clot.    Partially seen subcutaneous infiltration in the proximal left arm of   uncertain etiology. Infection such as cellulitis is a consideration, with   other etiologies not excluded. Recommend direct visual inspection.      < from: MR Head No Cont (10.08.23 @ 14:07) >  IMPRESSION: No acute hemorrhage mass mass effect or acute territorial   infarcts seen.        
DATE OF SERVICE: 10-09-23 @ 10:14  CHIEF COMPLAINT:Patient is a 73y old  Male who presents with a chief complaint of R/o CVA, possible prostatitis (09 Oct 2023 08:45)    	        PAST MEDICAL & SURGICAL HISTORY:  History of BPH      HTN (hypertension)      HLD (hyperlipidemia)              REVIEW OF SYSTEMS:      NECK: No pain or stiffness  RESPIRATORY: No cough, wheezing, chills or hemoptysis; No Shortness of Breath  CARDIOVASCULAR: No chest pain, palpitations, passing out, dizziness, or leg swelling  GASTROINTESTINAL: No abdominal or epigastric pain. No nausea, vomiting, or hematemesis; No diarrhea or constipation. No melena or hematochezia.  GENITOURINARY:hematuria / benitez  NEUROLOGICAL: No headaches,    Medications:  MEDICATIONS  (STANDING):  acetaminophen   IVPB .. 1000 milliGRAM(s) IV Intermittent once  cefepime   IVPB      cefepime   IVPB 1000 milliGRAM(s) IV Intermittent every 8 hours  sodium chloride 0.9%. 1000 milliLiter(s) (75 mL/Hr) IV Continuous <Continuous>    MEDICATIONS  (PRN):    	    PHYSICAL EXAM:  T(C): 36.7 (10-09-23 @ 04:40), Max: 36.9 (10-08-23 @ 14:29)  HR: 67 (10-09-23 @ 04:40) (67 - 82)  BP: 114/76 (10-09-23 @ 04:40) (114/76 - 129/73)  RR: 18 (10-09-23 @ 04:40) (18 - 18)  SpO2: 99% (10-09-23 @ 04:40) (99% - 99%)  Wt(kg): --  I&O's Summary    08 Oct 2023 07:01  -  09 Oct 2023 07:00  --------------------------------------------------------  IN: 0 mL / OUT: 3000 mL / NET: -3000 mL        Appearance: Normal	  HEENT:   Normal oral mucosa,  Cardiovascular: Normal S1 S2, No JVD,   Respiratory: Lungs clear to auscultation	  Psychiatry: A & O x 3, Mood & affect appropriate  Gastrointestinal:  Soft, Non-tender, + BS	    Neurologic: Non-focal  Extremities: Normal range of motion,    TELEMETRY: 	    ECG:  	  RADIOLOGY:  OTHER: 	  	  LABS:	 	    CARDIAC MARKERS:                                9.6    12.27 )-----------( 204      ( 09 Oct 2023 06:52 )             28.2     10-09    137  |  104  |  10  ----------------------------<  94  4.0   |  22  |  0.97    Ca    9.8      09 Oct 2023 06:52    TPro  5.1<L>  /  Alb  2.7<L>  /  TBili  0.4  /  DBili  x   /  AST  13  /  ALT  10  /  AlkPhos  53  10-09    proBNP:   Lipid Profile:   HgA1c:   TSH:     	        
SUBJECTIVE/INTERVAL HISTORY:    Feels like his speech is getting better. Seen at bedside with nurse aide. Wants to get back to his surgeon as quick as possible. Has benitez with hematuria.     PAST MEDICAL & SURGICAL HISTORY:  History of BPH      HTN (hypertension)      HLD (hyperlipidemia)        FAMILY HISTORY:      MEDICATIONS (HOME):  Home Medications:  atorvastatin 10 mg oral tablet: 1 tab(s) orally once a day (08 Oct 2023 04:47)  Diovan 320 mg oral tablet: 1 tab(s) orally once a day (08 Oct 2023 04:46)  finasteride 5 mg oral tablet: 1 tab(s) orally once a day (08 Oct 2023 04:46)    MEDICATIONS  (STANDING):  acetaminophen   IVPB .. 1000 milliGRAM(s) IV Intermittent once  cefepime   IVPB      cefepime   IVPB 1000 milliGRAM(s) IV Intermittent every 8 hours  sodium chloride 0.9%. 1000 milliLiter(s) (75 mL/Hr) IV Continuous <Continuous>    MEDICATIONS  (PRN):    ALLERGIES/INTOLERANCES:  Allergies  No Known Allergies    Intolerances    VITALS & EXAMINATION:  Vital Signs Last 24 Hrs  T(C): 36.7 (09 Oct 2023 04:40), Max: 37 (08 Oct 2023 09:26)  T(F): 98.1 (09 Oct 2023 04:40), Max: 98.6 (08 Oct 2023 09:26)  HR: 67 (09 Oct 2023 04:40) (67 - 82)  BP: 114/76 (09 Oct 2023 04:40) (114/76 - 145/84)  BP(mean): --  RR: 18 (09 Oct 2023 04:40) (18 - 18)  SpO2: 99% (09 Oct 2023 04:40) (98% - 99%)    Parameters below as of 09 Oct 2023 04:40  Patient On (Oxygen Delivery Method): room air        General:  Constitutional: normal appearing male   Neurological (>12):  MS: Awake, alert, tracking, oriented to person, place, not to time. Normal affect     Language: Speech is clear, fluent, able to repeat and name, follows simple and complex commands   CNs: PERRL (R = 3mm, L = 3mm). BTT b/l. EOMI no nystagmus. V1-3 intact to LT, well developed masseter muscles b/l. No facial asymmetry b/l. Hearing grossly normal (rubbing fingers) b/l. Head turning & shoulder shrug intact b/l. Tongue midline, normal movements, no atrophy.    Motor: Normal muscle bulk & tone. No noticeable tremor or seizure. No pronator drift.              Deltoid	Biceps	Triceps	Wrist	Finger ABd	   R	5	5	5	5			5 	  L	5	5	5	5			5    	H-Flex	K-Flex	K-Ext	D-Flex	P-Flex  R	5	5	5	5	5	   L	5	5	5	5	5	     Sensation: Intact to LT b/l throughout.     Cortical: Extinction on DSS (neglect): none    Reflexes: no ankle clonus              Biceps(C5)       BR(C6)     Triceps(C7)               Patellar(L4)    Achilles(S1)    Plantar Resp  R	3	          3             3		        3		    2		Down   L	3	          3	             3		        3		    2		Down     Coordination: No dysmetria to FTN    Gait: unable to assess     LABORATORY:  CBC                       9.6    12.27 )-----------( 204      ( 09 Oct 2023 06:52 )             28.2     Chem 10-09    137  |  104  |  10  ----------------------------<  94  4.0   |  22  |  0.97    Ca    9.8      09 Oct 2023 06:52    TPro  5.1<L>  /  Alb  2.7<L>  /  TBili  0.4  /  DBili  x   /  AST  13  /  ALT  10  /  AlkPhos  53  10-09    LFTs LIVER FUNCTIONS - ( 09 Oct 2023 06:52 )  Alb: 2.7 g/dL / Pro: 5.1 g/dL / ALK PHOS: 53 U/L / ALT: 10 U/L / AST: 13 U/L / GGT: x           Coagulopathy   Lipid Panel 10-08 Chol 92 LDL -- HDL 27<L> Trig 71  A1c   Cardiac enzymes     U/A Urinalysis Basic - ( 09 Oct 2023 06:52 )    Color: x / Appearance: x / SG: x / pH: x  Gluc: 94 mg/dL / Ketone: x  / Bili: x / Urobili: x   Blood: x / Protein: x / Nitrite: x   Leuk Esterase: x / RBC: x / WBC x   Sq Epi: x / Non Sq Epi: x / Bacteria: x        STUDIES & IMAGING:  Studies (EKG, EEG, EMG, etc):     Radiology (XR, CT, MR, U/S, TTE/JOHN):    MR Head No Cont (10.08.23 @ 14:07)   No acute hemorrhage mass mass effect or acute territorial   infarcts seen.    < end of copied text >    CT PERFUSION BRAIN:  No evidenceof territorial infarct or ischemia.    CTA HEAD:  No flow-limiting stenosis, occlusion or aneurysm.    CTA NECK:  No flow-limiting stenosis, occlusion or dissection.    1.5 cm increased density lesion left tracheoesophageal groove at the T2   level. Differential includes ectopic thyroid tissue as well as   parathyroid adenoma. Suggest nonemergent thyroid/parathyroid workup   including ultrasound.    CT Brain Stroke Protocol (10.07.23 @ 20:56)   No acute intracranial findings.
Urology Progress Note    Overnight Events:  No acute urologic events overnight. h/h stable. mental status back to baseline. CT with clot around benitez    Review of Systems:   Constitutional: No weight loss, no weakness  CV: No chest pain, no chest pressure  Pulm: No shortness of breath, cough, or sputum    Physical Exam:  Vital signs  T(C): 36.7 (10-09-23 @ 04:40), Max: 37 (10-08-23 @ 09:26)  HR: 67 (10-09-23 @ 04:40)  BP: 114/76 (10-09-23 @ 04:40)  SpO2: 99% (10-09-23 @ 04:40)  Wt(kg): --    Gen: No acute distress. Normal mood  Abd: soft, non-tender, non-distended  : Non-palpable bladder no sp tenderness benitez with hematuria    Labs:      10-09 @ 06:52    WBC 12.27 / Hct 28.2  / SCr 0.97     10-08 @ 19:57    WBC 16.62 / Hct 27.9  / SCr --       10-09    137  |  104  |  10  ----------------------------<  94  4.0   |  22  |  0.97    Ca    9.8      09 Oct 2023 06:52    TPro  5.1<L>  /  Alb  2.7<L>  /  TBili  0.4  /  DBili  x   /  AST  13  /  ALT  10  /  AlkPhos  53  10-09      Urinalysis Basic - ( 09 Oct 2023 06:52 )    Color: x / Appearance: x / SG: x / pH: x  Gluc: 94 mg/dL / Ketone: x  / Bili: x / Urobili: x   Blood: x / Protein: x / Nitrite: x   Leuk Esterase: x / RBC: x / WBC x   Sq Epi: x / Non Sq Epi: x / Bacteria: x    
DATE OF SERVICE: 10-08-23 @ 14:42  CHIEF COMPLAINT:Patient is a 73y old  Male who presents with a chief complaint of R/o CVA, possible prostatitis (08 Oct 2023 10:43)    	        PAST MEDICAL & SURGICAL HISTORY:  History of BPH      HTN (hypertension)      HLD (hyperlipidemia)                NECK: No pain or stiffness  RESPIRATORY: No cough, wheezing, chills or hemoptysis; No Shortness of Breath  CARDIOVASCULAR: No chest pain, palpitations, passing out, dizziness, or leg swelling  GASTROINTESTINAL: No abdominal or epigastric pain. No nausea, vomiting, or hematemesis; No diarrhea or constipation. No melena or hematochezia.  GENITOURINARY:benitez w/ hematuria  NEUROLOGICAL: No headaches,     Medications:  MEDICATIONS  (STANDING):  acetaminophen   IVPB .. 1000 milliGRAM(s) IV Intermittent once  levoFLOXacin IVPB 750 milliGRAM(s) IV Intermittent every 24 hours  sodium chloride 0.9%. 1000 milliLiter(s) (75 mL/Hr) IV Continuous <Continuous>    MEDICATIONS  (PRN):    	    PHYSICAL EXAM:  T(C): 36.9 (10-08-23 @ 14:29), Max: 38.4 (10-07-23 @ 20:45)  HR: 74 (10-08-23 @ 14:29) (74 - 108)  BP: 118/69 (10-08-23 @ 14:29) (118/69 - 178/91)  RR: 18 (10-08-23 @ 14:29) (16 - 28)  SpO2: 99% (10-08-23 @ 14:29) (96% - 99%)  Wt(kg): --  I&O's Summary    07 Oct 2023 07:01  -  08 Oct 2023 07:00  --------------------------------------------------------  IN: 0 mL / OUT: 900 mL / NET: -900 mL        Appearance: Normal	  HEENT:   Normal oral mucosa, PERRL, EOMI	  Lymphatic: No lymphadenopathy  Cardiovascular: Normal S1 S2, No JVD, No murmurs, No edema  Respiratory: Lungs clear to auscultation	  Psychiatry: A & O x 3, Mood & affect appropriate  Gastrointestinal:  Soft, Non-tender, + BS	  Skin: No rashes, No ecchymoses, No cyanosis	  Neurologic: Non-focal  hematuria     TELEMETRY: 	    ECG:  	  RADIOLOGY:  OTHER: 	  	  LABS:	 	    CARDIAC MARKERS:                                9.3    15.91 )-----------( 182      ( 08 Oct 2023 06:54 )             26.4     10-08    133<L>  |  99  |  8   ----------------------------<  99  3.3<L>   |  20<L>  |  1.03    Ca    9.1      08 Oct 2023 06:54    TPro  5.0<L>  /  Alb  2.7<L>  /  TBili  0.5  /  DBili  x   /  AST  10  /  ALT  9<L>  /  AlkPhos  52  10-08    proBNP:   Lipid Profile:   HgA1c:   TSH: Thyroid Stimulating Hormone, Serum: 1.38 uIU/mL (10-08 @ 06:54)

## 2023-10-09 NOTE — CHART NOTE - NSCHARTNOTEFT_GEN_A_CORE
Due to condition decreased strength, endurance and balance leading to moderate impairment in functional mobility. patient has a severe mobility limitation and requires a standard wheelchair to assist in daily ADLS. Patient cannot ambulate safely with a cane or a walker. Patient has sufficient upper body strength to self propel said wheelchair and has not expressed an unwillingness to use the wheelchair. patient has ample room in the home and a caregiver to assist with the wheelchair, Use of a manual wheelchair will significantly improve the patients ability to participate in daily ADL's and the patient will use it on a regular basis in the home Due to condition decreased strength, endurance and balance leading to moderate impairment in functional mobility. patient has a severe mobility limitation and requires a Rolling walker

## 2023-10-09 NOTE — DISCHARGE NOTE PROVIDER - NSDCMRMEDTOKEN_GEN_ALL_CORE_FT
atorvastatin 10 mg oral tablet: 1 tab(s) orally once a day  Diovan 320 mg oral tablet: 1 tab(s) orally once a day  finasteride 5 mg oral tablet: 1 tab(s) orally once a day  Peggy Frey R26: use as directed   atorvastatin 10 mg oral tablet: 1 tab(s) orally once a day  Diovan 320 mg oral tablet: 1 tab(s) orally once a day  finasteride 5 mg oral tablet: 1 tab(s) orally once a day

## 2023-10-09 NOTE — DISCHARGE NOTE PROVIDER - NSDCFUSCHEDAPPT_GEN_ALL_CORE_FT
Deidra Ac Physician [facility]  UROLOGY [address] [place] [address]  Scheduled Appointment: 12/12/2023

## 2023-10-09 NOTE — DISCHARGE NOTE PROVIDER - CARE PROVIDER_API CALL
Deidra Ac  Urology  [address], [address]  [place] [address]  Phone: [phone]  Fax: [phone]  Follow Up Time: 1-3 days    Grant Galicia  Neurology  [address], [address]  [place] [address]  Phone: [phone]  Fax: [phone]  Follow Up Time: 1 week   Deidra Ac  Urology  [address], [address]  [place] [address]  Phone: [phone]  Fax: [phone]  Follow Up Time: 1-3 days    Grant Galicia  Neurology  [address]  [place] [address]  Phone: [phone]  Fax: [phone]  Follow Up Time: 1 week    PCP,   Phone: (   )    -  Fax: (   )    -  Established Patient  Follow Up Time: 1-3 days

## 2023-10-09 NOTE — SWALLOW BEDSIDE ASSESSMENT ADULT - SWALLOW EVAL: DIAGNOSIS
73y M w/ PMHx of BPH s/p prostate surgery on 10/4/23 presents with speech disturbance. CTH and MRI negative for CVA; suspect delirium 2/2 to prostatitis. Pt presents w/ an overtly functional oral/pharyngeal swallow for regular solids and thin liquids. No overt s/s of laryngeal penetration/aspiration noted across trials. Upon palpation, hyolaryngeal excursion and onset of pharyngeal swallow are WNL. Pt has no skilled ST needs; this service will sign off at this time.

## 2023-10-09 NOTE — DISCHARGE NOTE NURSING/CASE MANAGEMENT/SOCIAL WORK - NSDCFUADDAPPT_GEN_ALL_CORE_FT
Patient can follow up with Dr. Grant Galicia after discharge. Please instruct the patient/family to call [phone] to schedule an appointment within the next 1-2 weeks. Office is located at [address] [place] [address], [place] [address].    Followup urgently with your surgeon or urologist for the clot noted on your ct.    APPTS ARE READY TO BE MADE: [ x] YES  also follow up thyroid sono with pcp

## 2023-10-09 NOTE — SWALLOW BEDSIDE ASSESSMENT ADULT - COMMENTS
IMAGING:  10/7 CT brain: IMPRESSION: No acute intracranial findings.  10/8 MRI head: IMPRESSION: No acute hemorrhage mass mass effect or acute territorial infarcts seen.    ***Pt is not previously known to this service and no prior swallow studies noted in PACS. Of note, Pt failed dysphagia screen 10/8.

## 2023-10-09 NOTE — DISCHARGE NOTE NURSING/CASE MANAGEMENT/SOCIAL WORK - PATIENT PORTAL LINK FT
You can access the FollowMyHealth Patient Portal offered by [facility] by registering at the following website: [email or web address]. By joining [employer]’s FollowMyHealth portal, you will also be able to view your health information using other applications (apps) compatible with our system.

## 2023-10-09 NOTE — SPEECH LANGUAGE PATHOLOGY EVALUATION - SLP PRECAUTIONS/LIMITATIONS: VISION
----- Message from Myriam Samuel RN sent at 6/29/2021  7:32 AM CDT -----    ----- Message -----  From: SHREE Chavez  Sent: 6/29/2021   7:30 AM CDT  To: [facility] Nurse Msg Pool    Please notify the patient of results. Will discuss further at upcoming appt.  Follow-up as planned.     +glasses

## 2023-10-09 NOTE — DISCHARGE NOTE NURSING/CASE MANAGEMENT/SOCIAL WORK - NSDCPEFALRISK_GEN_ALL_CORE
For information on Fall & Injury Prevention, visit: [email or web address] OR  [email or web address] OR  https://www.cdc.gov/steadi/patient.html

## 2023-10-09 NOTE — PROGRESS NOTE ADULT - ASSESSMENT
73M w/Hx of HTN, HLD, BPH s/p prostate surgery (Aquablation) on 10/4/23 presents with aphasia and hematuria into his benitez.        Problem/Plan - 1:  ·  Problem: Toxic metabolic encephalopathy. resolving  ·  Plan: Pt presenting with aphasia, resolved    - Speech and swallow eval  - Possible delirium 2/2 to prostatitis vs CVA  - Neurology following, follow recs  - Tele monitoring     Problem/Plan - 2:  ·  Problem: Acute prostatitis.   ·  Plan: Patient presenting with fever and leukocytosis, UA negative for bacteria s/p recent prostate procedure (aquablation)  abs as per id   iv fluids  - F/u blood cx  - May be due to inflammation/hematoma surrounding prostate    - Benitez in place,      Problem/Plan - 3:  ·  Problem: Hematuria.   ·  Plan: - Gross blood draining from benitez  - No clots noted  - Hold VTE ppx.  urology eval      Problem/Plan - 4:  ·  Problem: HTN (hypertension).   ·  Plan: - Hold home diovan.     Problem/Plan - 5:  ·  Problem: HLD (hyperlipidemia).   ·  Plan: - Hold home atorvastatin.     Problem/Plan - 6:  ·  Problem: R/O CVA (cerebrovascular accident).   symptoms resolved  mri neg   neuro f/u  
73M RH w/ HTN, HLD, BPH s/p prostate surgery (Aquablation) on 10/4/23 presents with speech disturbance. LKN 10/7/23 at 17:30. On 10/7/23 at 18:00, pt was noted to have a speech disturbance. Of note, pt had a prostate surgery on 10/4/23, noted to have significant bleeding in his benitez, ~4000ml of blood emptied overnight on 10/6/23 and ~1500ml of blood emptied on 10/7/23. In ED, , 1000ml of blood in benitez, Tmax 101.1    NIHSS: 2  preMRS: 0  Pt is not a candidate for tenecteplase due to significant bleeding, recent surgery  Pt is not a candidate for mechanical thrombectomy due to no large vessel occlusion on CTA    Impression: mixed aphasia (fluent, expressive >receptive) possibly 2/2 toxic/metabolic/infectious etiology with improvement. Demonstrated symptoms despite MR head being negative     Recommendations:  [] toxic/metabolic/infectious workup per primary team  [] consider IVFs  [] MRI brain w/w/o completed   [] Follow up with Neurology as outpatient. Patient can follow up with Dr. Grant Galicia after discharge. Please instruct the patient/family to call [phone] to schedule an appointment within the next 1-2 weeks. Office is located at [address] [place] [address].  [] Would require EEG in the outpatient setting with Dr. Galicia and TIA workup   [] no AC/AP at this time given significant bleeding (two point drop in Hb between 10/7-8)  [] rest of care per primary team  [] No neurological contraindication to discharge as patient is otherwise stable  
73M sp aquablation last week at [facility]  - patient comfortable, CT with clot in bladder  - if no other services recommending acute management his patient should be transferred to [place] or discharged to see his surgeon for this direct post op complication    
73M w/Hx of HTN, HLD, BPH s/p prostate surgery (Aquablation) on 10/4/23 presents with aphasia and hematuria into his benitez.        Problem/Plan - 1:  ·  Problem: Toxic metabolic encephalopathy. resolved   ·  Plan: Pt presenting with aphasia, resolved  - Neurology following, follow recs  ok for d/c        Problem/Plan - 2:  ·  Problem: Acute prostatitis.   ·  Plan: Patient presenting with fever and leukocytosis, UA negative for bacteria s/p recent prostate procedure (aquablation)  abs as per id   ? change to po   iv fluids prn   - F/u blood cx neg  - May be due to inflammation/hematoma surrounding prostate    - Benitez in place,      Problem/Plan - 3:  ·  Problem: Hematuria.   ·  Plan: - Gross blood draining from benitez  - No clots noted  - Hold VTE ppx.  urology eval noted  to f/u w/ outpt urologist      Problem/Plan - 4:  ·  Problem: HTN (hypertension).   stable      Problem/Plan - 5:  ·  Problem: HLD (hyperlipidemia).        Problem/Plan - 6:  ·  Problem: R/O CVA (cerebrovascular accident).   symptoms resolved  mri neg   neuro f/u noted    likely d/c later today if ct s neg and ok w/ id     discussed w/ pmd/ wife / pt  
73M with HTN, HLD, BPH s/p prostate surgery (Aquablation) on 10/4/23 presents with aphasia and hematuria into his benitez, found to be febrile 101F, WBC 15, UA with RBC 93, but no WBC, CT H/N negative, s/p Code Stroke and Neuro evaluation pending MRI Brain, ID consulted for assistance.    Patient remains aphasic, otherwise denies any other complains  Denies cough, dyspnea, chills, abdominal pain, n/v, diarrhea  Denies any dysuria or flank pain  Remains on benitez with mild blood tinge     Prior UCx 9/20/23 with Citrobacter freudii S fluoroquinolone, bactrim, cefepime      DIAGNOSIS and IMPRESSION:  #Fever, leucocytosis, tachycardia, SIRS   #Aphasia   #Hematuria      - significant aphasia, but now appears to have improved  - MRI negative for CVA  - symptoms related to recent prostate ablation therapy?  - no urinary symptoms aside from exam showing gross hematuria, U/A with no pyuria.       RECOMMENDATIONS:  - trend WBC, + leucocytosis   - ct with heterogenous prostate, discussed with urology attending, likely due to his ablation. no abx needed.   - All cultures negative to date.   - recommended to stop abx and monitor off   - Urology on board.  - Neuro on board.   - pt has an appointment with his urologist tomorrow.       Plan discussed with Medicine ACP.     Az Wallace  Please contact through MS Teams   If no response or past 5 pm/weekend call [phone].

## 2023-10-09 NOTE — SPEECH LANGUAGE PATHOLOGY EVALUATION - SLP DIAGNOSIS
73y M w/ PMHx of BPH s/p prostate surgery on 10/4/23 presents with speech disturbance. CTH and MRI negative for CVA; suspect delirium 2/2 to prostatitis. Pt presents w/ functional expressive and receptive language. No evidence aphasia or dysarthria. Naming, repetition, automatic speech, and comprehension are intact. Mild cognitive deficits noted in the area of short term memory, however, Pt/fiancee endorse this is baseline status and would not like to pursue intervention. This service will sign off at this time.

## 2023-10-09 NOTE — DISCHARGE NOTE PROVIDER - CARE PROVIDERS DIRECT ADDRESSES
[email or web address],DirectAddress_Unknown [email or web address],DirectAddress_Unknown,DirectAddress_Unknown

## 2023-10-09 NOTE — DISCHARGE NOTE PROVIDER - HOSPITAL COURSE
HPI:  73M w/Hx of HTN, HLD, BPH s/p prostate surgery (Aquablation) on 10/4/23 presents with aphasia and hematuria into his benitez. Patient recently had aquablation performed by Dr. Greardo Arreaga at [facility]. Was having hematuria after his procedure into his benitez bag as was expected post-op and was sent home with benitez and discharged Friday night from [facility]. Benitez was to be removed on monday. yesterday, patient arose from a nap and was unable to communicate with any proper speech, began speaking garbled speech and his partner called his doctor who advised for him to go to the nearest hospital. In ED, code stroke called to r/o stroke. Pt CTs negative but found to be febrile. Pt not on any home AC. Had history of a stutter in the past that had resolved. Pt unable to speak properly but able to follow commands and demonstrates understanding. Pt is lethargic. Patient denies chest pain, SOB, headache, dizziness, abd pain, nausea, vomiting. 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 s/p code stroke in ED, s/p CT angio: negative for infarct/ischemia. MRI Brain non acute. Demonstrated symptoms despite MR head being negative Patient can follow up with Dr. Grant Galicia after discharge.     Patient also presenting with fever and leukocytosis, UA negative for bacteria s/p recent prostate procedure (aquablation)  s/p cefepime iv, f/u blood cx neg. also experiencing Hematuria. VTE ppx on hold. urology evaluated. ct noted clot in bladder.  recommend pt to see his surgeon and urologist for this direct post op complication    Important Medication Changes and Reason:    Active or Pending Issues Requiring Follow-up:  UROLOGY  NEURO    Advanced Directives:   [ X] Full code  [ ] DNR  [ ] Hospice    Discharge Diagnoses:  APHASIA        Important Medication Changes and Reason:    Active or Pending Issues Requiring Follow-up:    Advanced Directives:   [ ] Full code  [ ] DNR  [ ] Hospice    Discharge Diagnoses:         HPI:  73M w/Hx of HTN, HLD, BPH s/p prostate surgery (Aquablation) on 10/4/23 presents with aphasia and hematuria into his benitez. Patient recently had aquablation performed by Dr. Gerardo Arreaga at [facility]. Was having hematuria after his procedure into his benitez bag as was expected post-op and was sent home with benitez and discharged Friday night from [facility]. Benitez was to be removed on monday. yesterday, patient arose from a nap and was unable to communicate with any proper speech, began speaking garbled speech and his partner called his doctor who advised for him to go to the nearest hospital. In ED, code stroke called to r/o stroke. Pt CTs negative but found to be febrile. Pt not on any home AC. Had history of a stutter in the past that had resolved. Pt unable to speak properly but able to follow commands and demonstrates understanding. Pt is lethargic. Patient denies chest pain, SOB, headache, dizziness, abd pain, nausea, vomiting. 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 s/p code stroke in ED, s/p CT angio: negative for infarct/ischemia. MRI Brain non acute. Demonstrated symptoms despite MR head being negative Patient can follow up with Dr. Grant Galicia after discharge. Aphasia resolved .    Patient also presenting with fever and leukocytosis, UA negative for bacteria s/p recent prostate procedure (aquablation)  s/p cefepime iv, f/u blood cx neg. also experiencing Hematuria. VTE ppx on hold. urology evaluated. ct noted clot in bladder.  recommend pt to see his surgeon and urologist for this direct post op complication      Important Medication Changes and Reason: none    Active or Pending Issues Requiring Follow-up: needs outpatient thyroid US, outpatient PT    Advanced Directives:   [ ] Full code  [ ] DNR  [ ] Hospice    Discharge Diagnoses:  Asphasia resolved  Hematuria  possible thyroid lesion

## 2023-10-09 NOTE — DISCHARGE NOTE PROVIDER - PROVIDER TOKENS
PROVIDER:[TOKEN:[[id number]],FOLLOWUP:[1-3 days]],PROVIDER:[TOKEN:[[id number]],FOLLOWUP:[1 week]] PROVIDER:[TOKEN:[[id number]],FOLLOWUP:[1-3 days]],PROVIDER:[TOKEN:[[id number]],FOLLOWUP:[1 week]],FREE:[LAST:[PCP],PHONE:[(   )    -],FAX:[(   )    -],FOLLOWUP:[1-3 days],ESTABLISHEDPATIENT:[T]]

## 2023-10-09 NOTE — SPEECH LANGUAGE PATHOLOGY EVALUATION - SLP GENERAL OBSERVATIONS
Encountered Pt sitting upright on stretcher on RA. Pt is A&Ox4, verbally responsive, and able to follow commands.

## 2023-10-09 NOTE — SPEECH LANGUAGE PATHOLOGY EVALUATION - COMMENTS
IMAGING:  10/7 CT brain: IMPRESSION: No acute intracranial findings.  10/8 MRI head: IMPRESSION: No acute hemorrhage mass mass effect or acute territorial infarcts seen.    ***Pt is not previously known to this service and no prior swallow studies noted in PACS. Of note, Pt failed dysphagia screen 10/8. N/A SLP d/w FAHAD [place] and FLORA WNL; Pt denies changes to voice

## 2023-10-09 NOTE — DISCHARGE NOTE PROVIDER - NSDCFUADDAPPT_GEN_ALL_CORE_FT
Patient can follow up with Dr. Grant Galicia after discharge. Please instruct the patient/family to call [phone] to schedule an appointment within the next 1-2 weeks. Office is located at [address] [place] [address].    Followup urgently with your surgeon or urologist for the clot noted on your ct.    APPTS ARE READY TO BE MADE: [ x] YES     Patient can follow up with Dr. Grant Galicia after discharge. Please instruct the patient/family to call [phone] to schedule an appointment within the next 1-2 weeks. Office is located at [address] [place] [address], [place] [address].    Followup urgently with your surgeon or urologist for the clot noted on your ct.    APPTS ARE READY TO BE MADE: [ x] YES  also follow up thyroid sono with pcp

## 2023-10-13 LAB
CULTURE RESULTS: SIGNIFICANT CHANGE UP
CULTURE RESULTS: SIGNIFICANT CHANGE UP
SPECIMEN SOURCE: SIGNIFICANT CHANGE UP
SPECIMEN SOURCE: SIGNIFICANT CHANGE UP

## 2023-10-25 PROBLEM — I10 ESSENTIAL (PRIMARY) HYPERTENSION: Chronic | Status: ACTIVE | Noted: 2023-10-08

## 2023-10-25 PROBLEM — Z87.438 PERSONAL HISTORY OF OTHER DISEASES OF MALE GENITAL ORGANS: Chronic | Status: ACTIVE | Noted: 2023-10-08

## 2023-10-25 PROBLEM — E78.5 HYPERLIPIDEMIA, UNSPECIFIED: Chronic | Status: ACTIVE | Noted: 2023-10-08

## 2023-11-01 ENCOUNTER — APPOINTMENT (OUTPATIENT)
Dept: UROLOGY | Facility: CLINIC | Age: 73
End: 2023-11-01
Payer: MEDICARE

## 2023-11-01 VITALS
HEIGHT: 70.5 IN | WEIGHT: 172 LBS | BODY MASS INDEX: 24.35 KG/M2 | RESPIRATION RATE: 16 BRPM | DIASTOLIC BLOOD PRESSURE: 80 MMHG | SYSTOLIC BLOOD PRESSURE: 148 MMHG | HEART RATE: 69 BPM

## 2023-11-01 PROCEDURE — 99214 OFFICE O/P EST MOD 30 MIN: CPT

## 2023-11-02 LAB
APPEARANCE: ABNORMAL
BACTERIA: NEGATIVE /HPF
BILIRUBIN URINE: NEGATIVE
BLOOD URINE: ABNORMAL
CAST: 1 /LPF
COLOR: YELLOW
EPITHELIAL CELLS: 0 /HPF
GLUCOSE QUALITATIVE U: NEGATIVE MG/DL
KETONES URINE: NEGATIVE MG/DL
LEUKOCYTE ESTERASE URINE: ABNORMAL
MICROSCOPIC-UA: NORMAL
NITRITE URINE: NEGATIVE
PH URINE: 5.5
PROTEIN URINE: 100 MG/DL
RED BLOOD CELLS URINE: 31 /HPF
SPECIFIC GRAVITY URINE: 1.02
UROBILINOGEN URINE: 0.2 MG/DL
WHITE BLOOD CELLS URINE: 105 /HPF

## 2023-11-03 LAB — BACTERIA UR CULT: NORMAL

## 2023-12-12 ENCOUNTER — APPOINTMENT (OUTPATIENT)
Dept: UROLOGY | Facility: CLINIC | Age: 73
End: 2023-12-12
Payer: MEDICARE

## 2023-12-12 ENCOUNTER — LABORATORY RESULT (OUTPATIENT)
Age: 73
End: 2023-12-12

## 2023-12-12 PROCEDURE — 99214 OFFICE O/P EST MOD 30 MIN: CPT

## 2023-12-13 LAB
APPEARANCE: CLEAR
BACTERIA: NEGATIVE /HPF
BILIRUBIN URINE: NEGATIVE
BLOOD URINE: ABNORMAL
CAST: 0 /LPF
COLOR: YELLOW
EPITHELIAL CELLS: 0 /HPF
GLUCOSE QUALITATIVE U: NEGATIVE MG/DL
KETONES URINE: NEGATIVE MG/DL
LEUKOCYTE ESTERASE URINE: ABNORMAL
MICROSCOPIC-UA: NORMAL
NITRITE URINE: NEGATIVE
PH URINE: 6.5
PROTEIN URINE: NEGATIVE MG/DL
PSA FREE FLD-MCNC: 23 %
PSA FREE SERPL-MCNC: 0.28 NG/ML
PSA SERPL-MCNC: 1.24 NG/ML
RED BLOOD CELLS URINE: 5 /HPF
SPECIFIC GRAVITY URINE: 1.01
UROBILINOGEN URINE: 0.2 MG/DL
WHITE BLOOD CELLS URINE: 19 /HPF

## 2023-12-13 PROCEDURE — 70551 MRI BRAIN STEM W/O DYE: CPT

## 2023-12-13 PROCEDURE — 81001 URINALYSIS AUTO W/SCOPE: CPT

## 2023-12-13 PROCEDURE — 92523 SPEECH SOUND LANG COMPREHEN: CPT

## 2023-12-13 PROCEDURE — 74177 CT ABD & PELVIS W/CONTRAST: CPT

## 2023-12-13 PROCEDURE — 84484 ASSAY OF TROPONIN QUANT: CPT

## 2023-12-13 PROCEDURE — 36415 COLL VENOUS BLD VENIPUNCTURE: CPT

## 2023-12-13 PROCEDURE — 0225U NFCT DS DNA&RNA 21 SARSCOV2: CPT

## 2023-12-13 PROCEDURE — 70450 CT HEAD/BRAIN W/O DYE: CPT | Mod: MA

## 2023-12-13 PROCEDURE — 83036 HEMOGLOBIN GLYCOSYLATED A1C: CPT

## 2023-12-13 PROCEDURE — 85014 HEMATOCRIT: CPT

## 2023-12-13 PROCEDURE — 84295 ASSAY OF SERUM SODIUM: CPT

## 2023-12-13 PROCEDURE — 70498 CT ANGIOGRAPHY NECK: CPT | Mod: MA

## 2023-12-13 PROCEDURE — 87086 URINE CULTURE/COLONY COUNT: CPT

## 2023-12-13 PROCEDURE — 86803 HEPATITIS C AB TEST: CPT

## 2023-12-13 PROCEDURE — 82330 ASSAY OF CALCIUM: CPT

## 2023-12-13 PROCEDURE — 84132 ASSAY OF SERUM POTASSIUM: CPT

## 2023-12-13 PROCEDURE — 84443 ASSAY THYROID STIM HORMONE: CPT

## 2023-12-13 PROCEDURE — 99291 CRITICAL CARE FIRST HOUR: CPT | Mod: 25

## 2023-12-13 PROCEDURE — 80061 LIPID PANEL: CPT

## 2023-12-13 PROCEDURE — 0042T: CPT | Mod: MA

## 2023-12-13 PROCEDURE — 97166 OT EVAL MOD COMPLEX 45 MIN: CPT

## 2023-12-13 PROCEDURE — 87040 BLOOD CULTURE FOR BACTERIA: CPT

## 2023-12-13 PROCEDURE — 82947 ASSAY GLUCOSE BLOOD QUANT: CPT

## 2023-12-13 PROCEDURE — 70496 CT ANGIOGRAPHY HEAD: CPT | Mod: MA

## 2023-12-13 PROCEDURE — 80053 COMPREHEN METABOLIC PANEL: CPT

## 2023-12-13 PROCEDURE — 82803 BLOOD GASES ANY COMBINATION: CPT

## 2023-12-13 PROCEDURE — 96375 TX/PRO/DX INJ NEW DRUG ADDON: CPT

## 2023-12-13 PROCEDURE — 82962 GLUCOSE BLOOD TEST: CPT

## 2023-12-13 PROCEDURE — 97162 PT EVAL MOD COMPLEX 30 MIN: CPT

## 2023-12-13 PROCEDURE — 85025 COMPLETE CBC W/AUTO DIFF WBC: CPT

## 2023-12-13 PROCEDURE — 83605 ASSAY OF LACTIC ACID: CPT

## 2023-12-13 PROCEDURE — 92610 EVALUATE SWALLOWING FUNCTION: CPT

## 2023-12-13 PROCEDURE — 85018 HEMOGLOBIN: CPT

## 2023-12-13 PROCEDURE — 96374 THER/PROPH/DIAG INJ IV PUSH: CPT

## 2023-12-13 PROCEDURE — 85027 COMPLETE CBC AUTOMATED: CPT

## 2023-12-13 PROCEDURE — 82435 ASSAY OF BLOOD CHLORIDE: CPT

## 2023-12-13 PROCEDURE — 71260 CT THORAX DX C+: CPT

## 2024-06-04 ENCOUNTER — APPOINTMENT (OUTPATIENT)
Dept: UROLOGY | Facility: CLINIC | Age: 74
End: 2024-06-04
Payer: MEDICARE

## 2024-06-04 VITALS
TEMPERATURE: 98.2 F | OXYGEN SATURATION: 96 % | DIASTOLIC BLOOD PRESSURE: 75 MMHG | HEART RATE: 73 BPM | SYSTOLIC BLOOD PRESSURE: 124 MMHG

## 2024-06-04 DIAGNOSIS — N40.1 BENIGN PROSTATIC HYPERPLASIA WITH LOWER URINARY TRACT SYMPMS: ICD-10-CM

## 2024-06-04 DIAGNOSIS — N13.8 BENIGN PROSTATIC HYPERPLASIA WITH LOWER URINARY TRACT SYMPMS: ICD-10-CM

## 2024-06-04 PROCEDURE — G2211 COMPLEX E/M VISIT ADD ON: CPT

## 2024-06-04 PROCEDURE — 99214 OFFICE O/P EST MOD 30 MIN: CPT

## 2024-06-05 LAB
APPEARANCE: CLEAR
BACTERIA: NEGATIVE /HPF
BILIRUBIN URINE: NEGATIVE
BLOOD URINE: NEGATIVE
CAST: 0 /LPF
COLOR: YELLOW
EPITHELIAL CELLS: 0 /HPF
GLUCOSE QUALITATIVE U: NEGATIVE MG/DL
KETONES URINE: NEGATIVE MG/DL
LEUKOCYTE ESTERASE URINE: NEGATIVE
MICROSCOPIC-UA: NORMAL
NITRITE URINE: NEGATIVE
PH URINE: 6.5
PROTEIN URINE: NEGATIVE MG/DL
PSA FREE FLD-MCNC: 24 %
PSA FREE SERPL-MCNC: 0.57 NG/ML
PSA SERPL-MCNC: 2.43 NG/ML
RED BLOOD CELLS URINE: 0 /HPF
SPECIFIC GRAVITY URINE: 1.01
UROBILINOGEN URINE: 0.2 MG/DL
WHITE BLOOD CELLS URINE: 0 /HPF

## 2024-12-06 ENCOUNTER — APPOINTMENT (OUTPATIENT)
Dept: ULTRASOUND IMAGING | Facility: IMAGING CENTER | Age: 74
End: 2024-12-06
Payer: MEDICARE

## 2024-12-06 ENCOUNTER — TRANSCRIPTION ENCOUNTER (OUTPATIENT)
Age: 74
End: 2024-12-06

## 2024-12-06 ENCOUNTER — OUTPATIENT (OUTPATIENT)
Dept: OUTPATIENT SERVICES | Facility: HOSPITAL | Age: 74
LOS: 1 days | End: 2024-12-06
Payer: MEDICARE

## 2024-12-06 ENCOUNTER — NON-APPOINTMENT (OUTPATIENT)
Age: 74
End: 2024-12-06

## 2024-12-06 DIAGNOSIS — R35.0 FREQUENCY OF MICTURITION: ICD-10-CM

## 2024-12-06 PROCEDURE — 76770 US EXAM ABDO BACK WALL COMP: CPT

## 2024-12-06 PROCEDURE — 76770 US EXAM ABDO BACK WALL COMP: CPT | Mod: 26

## 2024-12-07 LAB
APPEARANCE: CLEAR
BACTERIA: NEGATIVE /HPF
BILIRUBIN URINE: NEGATIVE
BLOOD URINE: ABNORMAL
CAST: 0 /LPF
COLOR: YELLOW
EPITHELIAL CELLS: 0 /HPF
GLUCOSE QUALITATIVE U: NEGATIVE MG/DL
KETONES URINE: NEGATIVE MG/DL
LEUKOCYTE ESTERASE URINE: NEGATIVE
MICROSCOPIC-UA: NORMAL
NITRITE URINE: NEGATIVE
PH URINE: 7
PROTEIN URINE: NEGATIVE MG/DL
RED BLOOD CELLS URINE: 2 /HPF
REVIEW: NORMAL
SPECIFIC GRAVITY URINE: 1
UROBILINOGEN URINE: 0.2 MG/DL
WHITE BLOOD CELLS URINE: 0 /HPF

## 2024-12-08 LAB — BACTERIA UR CULT: NORMAL

## 2024-12-09 LAB — URINE CYTOLOGY: NORMAL

## 2024-12-10 ENCOUNTER — APPOINTMENT (OUTPATIENT)
Dept: UROLOGY | Facility: CLINIC | Age: 74
End: 2024-12-10
Payer: MEDICARE

## 2024-12-10 VITALS
TEMPERATURE: 98 F | OXYGEN SATURATION: 97 % | HEART RATE: 66 BPM | SYSTOLIC BLOOD PRESSURE: 148 MMHG | DIASTOLIC BLOOD PRESSURE: 80 MMHG

## 2024-12-10 DIAGNOSIS — N13.8 BENIGN PROSTATIC HYPERPLASIA WITH LOWER URINARY TRACT SYMPMS: ICD-10-CM

## 2024-12-10 DIAGNOSIS — N40.1 BENIGN PROSTATIC HYPERPLASIA WITH LOWER URINARY TRACT SYMPMS: ICD-10-CM

## 2024-12-10 PROCEDURE — G2211 COMPLEX E/M VISIT ADD ON: CPT

## 2024-12-10 PROCEDURE — 99215 OFFICE O/P EST HI 40 MIN: CPT

## 2024-12-11 LAB
APPEARANCE: CLEAR
BACTERIA: NEGATIVE /HPF
BILIRUBIN URINE: NEGATIVE
BLOOD URINE: NEGATIVE
CAST: 0 /LPF
COLOR: YELLOW
EPITHELIAL CELLS: 0 /HPF
GLUCOSE QUALITATIVE U: NEGATIVE MG/DL
KETONES URINE: NEGATIVE MG/DL
LEUKOCYTE ESTERASE URINE: NEGATIVE
MICROSCOPIC-UA: NORMAL
NITRITE URINE: NEGATIVE
PH URINE: 6
PROTEIN URINE: NEGATIVE MG/DL
RED BLOOD CELLS URINE: 0 /HPF
SPECIFIC GRAVITY URINE: 1.01
UROBILINOGEN URINE: 0.2 MG/DL
WHITE BLOOD CELLS URINE: 0 /HPF

## 2024-12-12 LAB
BACTERIA UR CULT: NORMAL
URINE CYTOLOGY: NORMAL

## 2025-03-25 ENCOUNTER — APPOINTMENT (OUTPATIENT)
Dept: UROLOGY | Facility: CLINIC | Age: 75
End: 2025-03-25
Payer: MEDICARE

## 2025-03-25 DIAGNOSIS — N40.1 BENIGN PROSTATIC HYPERPLASIA WITH LOWER URINARY TRACT SYMPMS: ICD-10-CM

## 2025-03-25 DIAGNOSIS — N13.8 BENIGN PROSTATIC HYPERPLASIA WITH LOWER URINARY TRACT SYMPMS: ICD-10-CM

## 2025-03-25 PROCEDURE — G2211 COMPLEX E/M VISIT ADD ON: CPT

## 2025-03-25 PROCEDURE — 99214 OFFICE O/P EST MOD 30 MIN: CPT

## 2025-03-26 LAB
PSA FREE FLD-MCNC: 31 %
PSA FREE SERPL-MCNC: 1.27 NG/ML
PSA SERPL-MCNC: 4.07 NG/ML

## 2025-04-29 NOTE — ED PROVIDER NOTE - EKG #1 DATE/TIME
07-Oct-2023 20:42
CONSTITUTIONAL: Denies fever, chills, fatigue  HEENT: Denies acute changes in vision and hearing  CARDIO: Denies CP, SOB, palpitations  PULM: Denies cough, wheezing, SOB  ABD: Denies abd pain, n/v/d/c  : Denies dysuria, urinary frequency  NEURO: Denies HA, numbness/tingling  EXTREMITIES: Denies LE swelling, calf pain

## 2025-07-29 ENCOUNTER — APPOINTMENT (OUTPATIENT)
Dept: UROLOGY | Facility: CLINIC | Age: 75
End: 2025-07-29
Payer: MEDICARE

## 2025-07-29 DIAGNOSIS — N13.8 BENIGN PROSTATIC HYPERPLASIA WITH LOWER URINARY TRACT SYMPMS: ICD-10-CM

## 2025-07-29 DIAGNOSIS — N40.1 BENIGN PROSTATIC HYPERPLASIA WITH LOWER URINARY TRACT SYMPMS: ICD-10-CM

## 2025-07-29 PROCEDURE — G2211 COMPLEX E/M VISIT ADD ON: CPT

## 2025-07-29 PROCEDURE — 99214 OFFICE O/P EST MOD 30 MIN: CPT

## 2025-07-30 LAB
APPEARANCE: CLEAR
BACTERIA: NEGATIVE /HPF
BILIRUBIN URINE: NEGATIVE
BLOOD URINE: NEGATIVE
CAST: 0 /LPF
COLOR: YELLOW
EPITHELIAL CELLS: 0 /HPF
GLUCOSE QUALITATIVE U: NEGATIVE MG/DL
KETONES URINE: NEGATIVE MG/DL
LEUKOCYTE ESTERASE URINE: NEGATIVE
MICROSCOPIC-UA: NORMAL
NITRITE URINE: NEGATIVE
PH URINE: 6
PROTEIN URINE: NEGATIVE MG/DL
PSA FREE FLD-MCNC: 35 %
PSA FREE SERPL-MCNC: 0.79 NG/ML
PSA SERPL-MCNC: 2.28 NG/ML
RED BLOOD CELLS URINE: 0 /HPF
SPECIFIC GRAVITY URINE: 1.01
URINE CYTOLOGY: NORMAL
UROBILINOGEN URINE: 0.2 MG/DL
WHITE BLOOD CELLS URINE: 0 /HPF

## 2025-08-01 LAB — BACTERIA UR CULT: NORMAL
